# Patient Record
Sex: FEMALE | Race: WHITE | Employment: OTHER | URBAN - NONMETROPOLITAN AREA
[De-identification: names, ages, dates, MRNs, and addresses within clinical notes are randomized per-mention and may not be internally consistent; named-entity substitution may affect disease eponyms.]

---

## 2017-03-07 ENCOUNTER — OFFICE VISIT (OUTPATIENT)
Dept: FAMILY MEDICINE CLINIC | Age: 37
End: 2017-03-07

## 2017-03-07 VITALS
HEART RATE: 78 BPM | SYSTOLIC BLOOD PRESSURE: 110 MMHG | HEIGHT: 64 IN | DIASTOLIC BLOOD PRESSURE: 74 MMHG | BODY MASS INDEX: 35.85 KG/M2 | WEIGHT: 210 LBS | OXYGEN SATURATION: 92 % | TEMPERATURE: 98.3 F

## 2017-03-07 DIAGNOSIS — G81.92: ICD-10-CM

## 2017-03-07 DIAGNOSIS — N92.0 MENORRHAGIA WITH REGULAR CYCLE: Chronic | ICD-10-CM

## 2017-03-07 DIAGNOSIS — R56.9 PARTIAL SEIZURE (HCC): Primary | ICD-10-CM

## 2017-03-07 DIAGNOSIS — R30.0 DYSURIA: ICD-10-CM

## 2017-03-07 LAB
BILIRUBIN, POC: 0
BLOOD URINE, POC: NORMAL
CLARITY, POC: NORMAL
COLOR, POC: YELLOW
GLUCOSE URINE, POC: 0
KETONES, POC: 0
LEUKOCYTE EST, POC: 0
NITRITE, POC: 0
PH, POC: 6
PROTEIN, POC: 0
SPECIFIC GRAVITY, POC: 1.02
UROBILINOGEN, POC: NORMAL

## 2017-03-07 PROCEDURE — 96372 THER/PROPH/DIAG INJ SC/IM: CPT | Performed by: FAMILY MEDICINE

## 2017-03-07 PROCEDURE — 81002 URINALYSIS NONAUTO W/O SCOPE: CPT | Performed by: FAMILY MEDICINE

## 2017-03-07 PROCEDURE — 1036F TOBACCO NON-USER: CPT | Performed by: FAMILY MEDICINE

## 2017-03-07 PROCEDURE — G8484 FLU IMMUNIZE NO ADMIN: HCPCS | Performed by: FAMILY MEDICINE

## 2017-03-07 PROCEDURE — G8417 CALC BMI ABV UP PARAM F/U: HCPCS | Performed by: FAMILY MEDICINE

## 2017-03-07 PROCEDURE — G8428 CUR MEDS NOT DOCUMENT: HCPCS | Performed by: FAMILY MEDICINE

## 2017-03-07 PROCEDURE — 99213 OFFICE O/P EST LOW 20 MIN: CPT | Performed by: FAMILY MEDICINE

## 2017-03-07 RX ORDER — MEDROXYPROGESTERONE ACETATE 150 MG/ML
150 INJECTION, SUSPENSION INTRAMUSCULAR ONCE
Status: COMPLETED | OUTPATIENT
Start: 2017-03-07 | End: 2017-03-07

## 2017-03-07 RX ORDER — CETIRIZINE HYDROCHLORIDE, PSEUDOEPHEDRINE HYDROCHLORIDE 5; 120 MG/1; MG/1
1 TABLET, FILM COATED, EXTENDED RELEASE ORAL 2 TIMES DAILY PRN
Qty: 60 TABLET | Refills: 2 | Status: SHIPPED | OUTPATIENT
Start: 2017-03-07 | End: 2017-11-06

## 2017-03-07 RX ORDER — PHENYTOIN SODIUM 100 MG/1
300 CAPSULE, EXTENDED RELEASE ORAL NIGHTLY
Qty: 90 CAPSULE | Refills: 11 | Status: SHIPPED | OUTPATIENT
Start: 2017-03-07 | End: 2017-08-23 | Stop reason: SDUPTHER

## 2017-03-07 RX ORDER — DEXLANSOPRAZOLE 60 MG/1
60 CAPSULE, DELAYED RELEASE ORAL DAILY
Qty: 30 CAPSULE | Refills: 5 | Status: SHIPPED | OUTPATIENT
Start: 2017-03-07 | End: 2021-10-28 | Stop reason: SDUPTHER

## 2017-03-07 RX ORDER — MEDROXYPROGESTERONE ACETATE 150 MG/ML
150 INJECTION, SUSPENSION INTRAMUSCULAR ONCE
Qty: 1 ML | Refills: 2 | Status: ON HOLD | OUTPATIENT
Start: 2017-03-07 | End: 2017-10-24 | Stop reason: HOSPADM

## 2017-03-07 RX ORDER — POLYETHYLENE GLYCOL 3350 17 G/17G
17 POWDER, FOR SOLUTION ORAL
COMMUNITY
End: 2017-04-12 | Stop reason: SDUPTHER

## 2017-03-07 RX ADMIN — MEDROXYPROGESTERONE ACETATE 150 MG: 150 INJECTION, SUSPENSION INTRAMUSCULAR at 13:41

## 2017-03-07 ASSESSMENT — ENCOUNTER SYMPTOMS
RESPIRATORY NEGATIVE: 1
GASTROINTESTINAL NEGATIVE: 1
SHORTNESS OF BREATH: 0
ABDOMINAL PAIN: 0

## 2017-04-12 RX ORDER — POLYETHYLENE GLYCOL 3350 17 G/17G
POWDER, FOR SOLUTION ORAL
Qty: 527 G | Refills: 0 | Status: SHIPPED | OUTPATIENT
Start: 2017-04-12 | End: 2017-11-06

## 2017-04-13 ENCOUNTER — OFFICE VISIT (OUTPATIENT)
Dept: FAMILY MEDICINE CLINIC | Age: 37
End: 2017-04-13

## 2017-04-13 VITALS
DIASTOLIC BLOOD PRESSURE: 80 MMHG | SYSTOLIC BLOOD PRESSURE: 110 MMHG | TEMPERATURE: 97.8 F | WEIGHT: 209 LBS | HEIGHT: 66 IN | BODY MASS INDEX: 33.59 KG/M2

## 2017-04-13 DIAGNOSIS — J40 BRONCHITIS: Primary | ICD-10-CM

## 2017-04-13 PROCEDURE — G8417 CALC BMI ABV UP PARAM F/U: HCPCS | Performed by: FAMILY MEDICINE

## 2017-04-13 PROCEDURE — 1036F TOBACCO NON-USER: CPT | Performed by: FAMILY MEDICINE

## 2017-04-13 PROCEDURE — G8427 DOCREV CUR MEDS BY ELIG CLIN: HCPCS | Performed by: FAMILY MEDICINE

## 2017-04-13 PROCEDURE — 99213 OFFICE O/P EST LOW 20 MIN: CPT | Performed by: FAMILY MEDICINE

## 2017-04-13 RX ORDER — DOXYCYCLINE HYCLATE 100 MG/1
100 CAPSULE ORAL 2 TIMES DAILY
Qty: 20 CAPSULE | Refills: 0 | Status: SHIPPED | OUTPATIENT
Start: 2017-04-13 | End: 2017-04-23

## 2017-05-08 ENCOUNTER — NURSE ONLY (OUTPATIENT)
Dept: FAMILY MEDICINE CLINIC | Age: 37
End: 2017-05-08

## 2017-05-08 DIAGNOSIS — N92.0 MENORRHAGIA WITH REGULAR CYCLE: Primary | Chronic | ICD-10-CM

## 2017-05-08 PROCEDURE — 96372 THER/PROPH/DIAG INJ SC/IM: CPT | Performed by: FAMILY MEDICINE

## 2017-05-08 RX ORDER — MEDROXYPROGESTERONE ACETATE 150 MG/ML
150 INJECTION, SUSPENSION INTRAMUSCULAR ONCE
Status: COMPLETED | OUTPATIENT
Start: 2017-05-08 | End: 2017-05-08

## 2017-05-08 RX ADMIN — MEDROXYPROGESTERONE ACETATE 150 MG: 150 INJECTION, SUSPENSION INTRAMUSCULAR at 16:45

## 2017-08-07 ENCOUNTER — NURSE ONLY (OUTPATIENT)
Dept: FAMILY MEDICINE CLINIC | Age: 37
End: 2017-08-07
Payer: MEDICARE

## 2017-08-07 DIAGNOSIS — N92.0 MENORRHAGIA WITH REGULAR CYCLE: Primary | Chronic | ICD-10-CM

## 2017-08-07 PROCEDURE — 96372 THER/PROPH/DIAG INJ SC/IM: CPT | Performed by: FAMILY MEDICINE

## 2017-08-07 RX ORDER — MEDROXYPROGESTERONE ACETATE 150 MG/ML
150 INJECTION, SUSPENSION INTRAMUSCULAR ONCE
Status: COMPLETED | OUTPATIENT
Start: 2017-08-07 | End: 2017-08-07

## 2017-08-07 RX ADMIN — MEDROXYPROGESTERONE ACETATE 150 MG: 150 INJECTION, SUSPENSION INTRAMUSCULAR at 16:41

## 2017-08-23 ENCOUNTER — OFFICE VISIT (OUTPATIENT)
Dept: NEUROLOGY | Age: 37
End: 2017-08-23
Payer: MEDICARE

## 2017-08-23 VITALS
HEIGHT: 66 IN | BODY MASS INDEX: 33.11 KG/M2 | WEIGHT: 206 LBS | HEART RATE: 85 BPM | SYSTOLIC BLOOD PRESSURE: 125 MMHG | DIASTOLIC BLOOD PRESSURE: 80 MMHG

## 2017-08-23 DIAGNOSIS — R56.9 PARTIAL SEIZURE (HCC): Primary | ICD-10-CM

## 2017-08-23 PROCEDURE — 99213 OFFICE O/P EST LOW 20 MIN: CPT | Performed by: PSYCHIATRY & NEUROLOGY

## 2017-08-23 PROCEDURE — 1036F TOBACCO NON-USER: CPT | Performed by: PSYCHIATRY & NEUROLOGY

## 2017-08-23 PROCEDURE — G8427 DOCREV CUR MEDS BY ELIG CLIN: HCPCS | Performed by: PSYCHIATRY & NEUROLOGY

## 2017-08-23 PROCEDURE — G8417 CALC BMI ABV UP PARAM F/U: HCPCS | Performed by: PSYCHIATRY & NEUROLOGY

## 2017-08-23 RX ORDER — PHENYTOIN SODIUM 100 MG/1
300 CAPSULE, EXTENDED RELEASE ORAL NIGHTLY
Qty: 90 CAPSULE | Refills: 11 | Status: SHIPPED | OUTPATIENT
Start: 2017-08-23 | End: 2018-04-08 | Stop reason: SDUPTHER

## 2017-09-26 LAB
ABSOLUTE BASO #: 0 K/UL (ref 0–0.1)
ABSOLUTE EOS #: 0.3 K/UL (ref 0.1–0.4)
ABSOLUTE LYMPH #: 1.6 K/UL (ref 0.8–5.2)
ABSOLUTE MONO #: 0.7 K/UL (ref 0.1–0.9)
ABSOLUTE NEUT #: 6.7 K/UL (ref 1.3–9.1)
ALBUMIN SERPL-MCNC: 4.4 G/DL (ref 3.2–5.3)
ALK PHOSPHATASE: 136 IU/L (ref 35–121)
ALT SERPL-CCNC: 15 IU/L (ref 5–59)
AST SERPL-CCNC: 14 IU/L (ref 10–42)
BASOPHILS RELATIVE PERCENT: 0.4 %
BILIRUB SERPL-MCNC: 0.2 MG/DL (ref 0.2–1.3)
BILIRUBIN DIRECT: 0.1 MG/DL (ref 0–0.2)
EOSINOPHILS RELATIVE PERCENT: 3.2 %
HCT VFR BLD CALC: 44.6 % (ref 36–48)
HEMOGLOBIN: 15 G/DL (ref 12–16)
LYMPHOCYTE %: 16.8 %
MCH RBC QN AUTO: 28 PG (ref 27–34)
MCHC RBC AUTO-ENTMCNC: 33.6 G/DL (ref 31–36)
MCV RBC AUTO: 83.4 FL (ref 80–100)
MONOCYTES # BLD: 7.1 %
NEUTROPHILS RELATIVE PERCENT: 72 %
PDW BLD-RTO: 12.6 % (ref 10.8–14.8)
PLATELETS: 381 K/UL (ref 150–450)
RBC: 5.35 M/UL (ref 4–5.5)
TOTAL PROTEIN: 7.1 G/DL (ref 5.8–8)
WBC: 9.3 K/UL (ref 3.7–10.8)

## 2017-09-27 LAB
DILANTIN FREE/TOTAL: 7 UG/ML (ref 10–20)
DOSE TIME: ABNORMAL

## 2017-10-17 ENCOUNTER — TELEPHONE (OUTPATIENT)
Dept: FAMILY MEDICINE CLINIC | Age: 37
End: 2017-10-17

## 2017-10-17 DIAGNOSIS — L98.9 SKIN LESION OF BACK: Primary | ICD-10-CM

## 2017-10-24 ENCOUNTER — HOSPITAL ENCOUNTER (OUTPATIENT)
Age: 37
Setting detail: OUTPATIENT SURGERY
Discharge: HOME | End: 2017-10-24
Attending: INTERNAL MEDICINE | Admitting: INTERNAL MEDICINE
Payer: MEDICARE

## 2017-10-24 ENCOUNTER — ANESTHESIA (OUTPATIENT)
Dept: ENDOSCOPY | Age: 37
End: 2017-10-24
Payer: MEDICARE

## 2017-10-24 ENCOUNTER — ANESTHESIA EVENT (OUTPATIENT)
Dept: ENDOSCOPY | Age: 37
End: 2017-10-24
Payer: MEDICARE

## 2017-10-24 VITALS
SYSTOLIC BLOOD PRESSURE: 129 MMHG | RESPIRATION RATE: 29 BRPM | OXYGEN SATURATION: 98 % | DIASTOLIC BLOOD PRESSURE: 76 MMHG

## 2017-10-24 VITALS
RESPIRATION RATE: 18 BRPM | OXYGEN SATURATION: 99 % | HEART RATE: 94 BPM | HEIGHT: 63 IN | TEMPERATURE: 97.5 F | DIASTOLIC BLOOD PRESSURE: 80 MMHG | WEIGHT: 204 LBS | SYSTOLIC BLOOD PRESSURE: 123 MMHG | BODY MASS INDEX: 36.14 KG/M2

## 2017-10-24 PROCEDURE — 7100000000 HC PACU RECOVERY - FIRST 15 MIN: Performed by: INTERNAL MEDICINE

## 2017-10-24 PROCEDURE — 3609017700 HC EGD DILATION GASTRIC/DUODENAL STRICTURE: Performed by: INTERNAL MEDICINE

## 2017-10-24 PROCEDURE — 3700000000 HC ANESTHESIA ATTENDED CARE: Performed by: INTERNAL MEDICINE

## 2017-10-24 PROCEDURE — 2580000003 HC RX 258: Performed by: INTERNAL MEDICINE

## 2017-10-24 PROCEDURE — 2500000003 HC RX 250 WO HCPCS: Performed by: NURSE ANESTHETIST, CERTIFIED REGISTERED

## 2017-10-24 PROCEDURE — 6360000002 HC RX W HCPCS: Performed by: NURSE ANESTHETIST, CERTIFIED REGISTERED

## 2017-10-24 PROCEDURE — 7100000001 HC PACU RECOVERY - ADDTL 15 MIN: Performed by: INTERNAL MEDICINE

## 2017-10-24 RX ORDER — PROPOFOL 10 MG/ML
INJECTION, EMULSION INTRAVENOUS PRN
Status: DISCONTINUED | OUTPATIENT
Start: 2017-10-24 | End: 2017-10-24 | Stop reason: SDUPTHER

## 2017-10-24 RX ORDER — LIDOCAINE HYDROCHLORIDE 20 MG/ML
INJECTION, SOLUTION INFILTRATION; PERINEURAL PRN
Status: DISCONTINUED | OUTPATIENT
Start: 2017-10-24 | End: 2017-10-24 | Stop reason: SDUPTHER

## 2017-10-24 RX ORDER — SODIUM CHLORIDE 450 MG/100ML
INJECTION, SOLUTION INTRAVENOUS CONTINUOUS
Status: DISCONTINUED | OUTPATIENT
Start: 2017-10-24 | End: 2017-10-24 | Stop reason: HOSPADM

## 2017-10-24 RX ORDER — GLYCOPYRROLATE 0.2 MG/ML
INJECTION INTRAMUSCULAR; INTRAVENOUS PRN
Status: DISCONTINUED | OUTPATIENT
Start: 2017-10-24 | End: 2017-10-24 | Stop reason: SDUPTHER

## 2017-10-24 RX ORDER — FAMOTIDINE 20 MG/1
40 TABLET, FILM COATED ORAL DAILY
COMMUNITY
End: 2022-04-28 | Stop reason: CLARIF

## 2017-10-24 RX ADMIN — SODIUM CHLORIDE: 4.5 INJECTION, SOLUTION INTRAVENOUS at 13:28

## 2017-10-24 RX ADMIN — GLYCOPYRROLATE 0.1 MG: 0.2 INJECTION, SOLUTION INTRAMUSCULAR; INTRAVENOUS at 13:44

## 2017-10-24 RX ADMIN — LIDOCAINE HYDROCHLORIDE 40 MG: 20 INJECTION, SOLUTION INFILTRATION; PERINEURAL at 13:47

## 2017-10-24 RX ADMIN — SODIUM CHLORIDE: 4.5 INJECTION, SOLUTION INTRAVENOUS at 13:44

## 2017-10-24 RX ADMIN — PROPOFOL 100 MG: 10 INJECTION, EMULSION INTRAVENOUS at 13:44

## 2017-10-24 ASSESSMENT — PAIN - FUNCTIONAL ASSESSMENT: PAIN_FUNCTIONAL_ASSESSMENT: 0-10

## 2017-10-24 NOTE — CONSULTS
and discussion of sedation/procedure plan, risks, and expectations with patient and/or responsible adult completed. [x]Patient examined immediately prior to the procedure.  (Refer to nursing sedation/analgesia documentation record)    Cory Garner MD   Electronically signed 10/24/2017 at 1:12 PM

## 2017-10-25 NOTE — PROCEDURES
[address] [place] [address]                                PROCEDURE NOTE    PATIENT NAME: Jordy Be                     :             1980  MED REC NO:   [id number]                            ROOM:  ACCOUNT NO:   [de-identified]                            ADMISSION DATE:  10/24/2017  PROVIDER:     BORIS Leonardo Pilar:  10/24/2017    PROCEDURE:  EGD plus dilation. INDICATION FOR THE PROCEDURE:  The patient with a history of  intermittent dysphagia. See the preop note and the previous office  note for rest of clinicals. ASA CLASSIFICATION:  III. MEDICATION:  Per anesthesia. BIOPSY:  None. PHOTOGRAPHS:  Yes. DESCRIPTION OF THE PROCEDURE:  Informed consent was obtained after  explaining risks and benefits of the procedure and conscious sedation. Possible complications including bleeding, perforation, reaction to  medicine, but not limiting to death were discussed. Afterwards, the  patient was sedated in incremental fashion and the GIF-180 gastroscope  was advanced through oropharynx, esophagus, stomach into the duodenum. Normal-looking duodenal bulb and second portion. Scope was withdrawn. Normal antrum. Retroflex exam showed normal angularis, body of the  stomach, fundus, and cardia. Hiatal hernia was seen, lower esophageal  incompetent, slightly irregular GE junction. Some scarring, but  patient having more scarring close to UES. I felt patient will  benefit from dilation. The scope was advanced back into the antrum. Guidewire was left behind. A 54 dilator was used. The patient having  a component of oropharyngeal dysphagia as well. Tolerated the  procedure well. IMPRESSION:  1. Esophageal scarring, post-dilation. 2.  Longstanding GERD. 3.  Component of oropharyngeal dysphagia. RECOMMENDATION:  Continue Dexilant and we will see the patient in two  months.   If the patient benefit from dilation, will continue with PPI. If there is no improvement, we will consider the modified barium  swallow.         Flower Dickey M.D.    D: 10/24/2017 14:09:11       T: 10/24/2017 14:42:47     [email or web address]  Job#: [id number]     Doc#: [id number]    CC:  _____ 7:44

## 2017-11-06 ENCOUNTER — OFFICE VISIT (OUTPATIENT)
Dept: SURGERY | Age: 37
End: 2017-11-06
Payer: MEDICARE

## 2017-11-06 ENCOUNTER — NURSE ONLY (OUTPATIENT)
Dept: FAMILY MEDICINE CLINIC | Age: 37
End: 2017-11-06
Payer: MEDICARE

## 2017-11-06 VITALS
OXYGEN SATURATION: 98 % | SYSTOLIC BLOOD PRESSURE: 120 MMHG | DIASTOLIC BLOOD PRESSURE: 74 MMHG | WEIGHT: 208.5 LBS | BODY MASS INDEX: 34.74 KG/M2 | RESPIRATION RATE: 16 BRPM | TEMPERATURE: 98 F | HEIGHT: 65 IN | HEART RATE: 82 BPM

## 2017-11-06 DIAGNOSIS — N92.0 MENORRHAGIA WITH REGULAR CYCLE: Primary | Chronic | ICD-10-CM

## 2017-11-06 DIAGNOSIS — L82.1 KERATOSIS, SEBORRHEIC: ICD-10-CM

## 2017-11-06 PROCEDURE — 99201 PR OFFICE OUTPATIENT NEW 10 MINUTES: CPT | Performed by: SURGERY

## 2017-11-06 PROCEDURE — G8484 FLU IMMUNIZE NO ADMIN: HCPCS | Performed by: SURGERY

## 2017-11-06 PROCEDURE — 1036F TOBACCO NON-USER: CPT | Performed by: SURGERY

## 2017-11-06 PROCEDURE — G8417 CALC BMI ABV UP PARAM F/U: HCPCS | Performed by: SURGERY

## 2017-11-06 PROCEDURE — 96372 THER/PROPH/DIAG INJ SC/IM: CPT | Performed by: FAMILY MEDICINE

## 2017-11-06 PROCEDURE — G8427 DOCREV CUR MEDS BY ELIG CLIN: HCPCS | Performed by: SURGERY

## 2017-11-06 RX ORDER — MEDROXYPROGESTERONE ACETATE 150 MG/ML
150 INJECTION, SUSPENSION INTRAMUSCULAR ONCE
Status: COMPLETED | OUTPATIENT
Start: 2017-11-06 | End: 2017-11-06

## 2017-11-06 RX ORDER — CETIRIZINE HYDROCHLORIDE 10 MG/1
10 TABLET ORAL PRN
COMMUNITY
End: 2018-05-07 | Stop reason: SDUPTHER

## 2017-11-06 RX ADMIN — MEDROXYPROGESTERONE ACETATE 150 MG: 150 INJECTION, SUSPENSION INTRAMUSCULAR at 11:59

## 2017-11-06 ASSESSMENT — ENCOUNTER SYMPTOMS
ANAL BLEEDING: 0
SORE THROAT: 0
BACK PAIN: 0
STRIDOR: 0
PHOTOPHOBIA: 0
APNEA: 0
EYE REDNESS: 0
CHOKING: 0
CHEST TIGHTNESS: 0
COLOR CHANGE: 0
SINUS PRESSURE: 0
WHEEZING: 0
BLOOD IN STOOL: 0
FACIAL SWELLING: 0
COUGH: 0
TROUBLE SWALLOWING: 0
EYE DISCHARGE: 0
VOICE CHANGE: 0
ROS SKIN COMMENTS: MASS ON BACK
SHORTNESS OF BREATH: 0
CONSTIPATION: 0
ABDOMINAL PAIN: 0
VOMITING: 0
RHINORRHEA: 0
ABDOMINAL DISTENTION: 0
NAUSEA: 0
DIARRHEA: 0
EYE ITCHING: 0
RECTAL PAIN: 0
EYE PAIN: 0

## 2017-11-06 NOTE — PROGRESS NOTES
Doris Cota MD [facility]  General Surgery  New Patient Evaluation in Office  Pt Name: Rehan Yen  Date of Birth 1980   Today's Date: 11/6/2017  Medical Record Number: [id number]  Referring Provider: Brennon Chow MD  Primary Care Provider: Jermaine Jean-Baptiste MD  Chief Complaint   Patient presents with   [employer] [facility]     new pt-ref. Dr. Lauri Salinas for skin lesion on back-     ASSESSMENT      1. Keratosis, seborrheic       Past Medical History:   Diagnosis Date    Autism     Moderately mentally retarded     Nausea & vomiting 2013    Seizures ([address]           PLANS      1. Schedule Trish for nothing at this time  2. This is a benign lesion with no risk  3. It could be treated easily in a dermatologist office with no surgery if they desired      Cliff Douglas is a 44 y.o. female seen in the consultation for evaluation of a skin lesion on her back. We have seen her in the remote past for a lap erin. Per the Mom she has had the rough patch slowly enlarging on her back, and has several other smaller ones. Pt does not c/o and it does not seem to bother her.  No ulceration, or bleeding  Past Medical History  Past Medical History:   Diagnosis Date    Autism     Moderately mentally retarded     Nausea & vomiting 2013    Seizures [facility])      Past Surgical History  Past Surgical History:   Procedure Laterality Date    CHOLECYSTECTOMY, LAPAROSCOPIC  04/05/2013    robotic assist multiport    NASAL POLYP SURGERY  2013    OPAL    DC EGD BALLOON DILATION ESOPHAGUS <30 MM DIAM N/A 10/24/2017    EGD ESOPHAGOGASTRODUODENOSCOPY DILAT[facility]    UPPER GASTROINTESTINAL ENDOSCOPY       Medications  Current Outpatient Prescriptions   Medication Sig Dispense Refill    cetirizine (ZYRTEC) 10 MG tablet Take 10 mg by mouth as needed for Allergies      famotidine (PEPCID) 20 MG tablet Take 20 mg by mouth daily      phenytoin (DILANTIN) 100 MG ER capsule Take 3 capsules by mouth nightly 90 capsule 11    dexlansoprazole (DEXILANT) 60 MG CPDR delayed release capsule Take 1 capsule by mouth daily 30 capsule 5    Calcium Carbonate-Vitamin D (CALCIUM 500 + D) 500-125 MG-UNIT TABS Take 1 tablet by mouth 2 times daily 60 tablet 5    linaclotide (LINZESS) 290 MCG CAPS capsule Take 290 mcg by mouth every morning (before breakfast)      Diclofenac Potassium 25 MG CAPS Take 1 tablet by mouth 2 times daily       No current facility-administered medications for this visit. Allergies  is allergic to seasonal.  Family History  family history includes Arthritis in her father; Diabetes in her father and paternal grandfather; Heart Disease in her maternal grandfather; High Blood Pressure in her father and mother; High Cholesterol in her father and mother. Social History   reports that she has never smoked. She has never used smokeless tobacco. She reports that she does not drink alcohol or use drugs. Health Screening Exams  Health Maintenance   Topic Date Due    HIV screen  09/07/1994    DTaP/Tdap/Td vaccine (1 - Tdap) 09/07/1998    Flu vaccine (1) 09/01/2017    Cervical cancer screen  09/10/2018     Review of Systems  Constitutional: Negative for activity change, appetite change, chills, diaphoresis, fatigue, fever and unexpected weight change. HENT: Negative for congestion, dental problem, drooling, ear discharge, ear pain, facial swelling, hearing loss, mouth sores, nosebleeds, postnasal drip, rhinorrhea, sinus pressure, sneezing, sore throat, tinnitus, trouble swallowing and voice change. Eyes: Negative for photophobia, pain, discharge, redness, itching and visual disturbance. Respiratory: Negative for apnea, cough, choking, chest tightness, shortness of breath, wheezing and stridor. Cardiovascular: Negative for chest pain, palpitations and leg swelling.    Gastrointestinal: Negative for abdominal distention, abdominal pain, anal bleeding, blood in stool, constipation, diarrhea, nausea, rectal pain and vomiting. Genitourinary: Negative for decreased urine volume, difficulty urinating, dyspareunia, dysuria, enuresis, flank pain, frequency, genital sores, hematuria, menstrual problem, pelvic pain, urgency, vaginal bleeding, vaginal discharge and vaginal pain. Musculoskeletal: Negative for arthralgias, back pain, gait problem, joint swelling, myalgias, neck pain and neck stiffness. Skin: Positive for wound. Negative for color change, pallor and rash. Mass on back    Neurological: Negative for dizziness, tremors, seizures, syncope, facial asymmetry, speech difficulty, weakness, light-headedness, numbness and headaches. Hematological: Negative for adenopathy. Does not bruise/bleed easily. Psychiatric/Behavioral: Negative for agitation, behavioral problems, confusion, decreased concentration, dysphoric mood, hallucinations, self-injury, sleep disturbance and suicidal ideas. The patient is not nervous/anxious and is not hyperactive    OBJECTIVE    VITALS:  height is 5' 5\" (1.651 m) and weight is 208 lb 8 oz (94.6 kg). Her tympanic temperature is 98 °F (36.7 °C). Her blood pressure is 120/74 and her pulse is 82. Her respiration is 16 and oxygen saturation is 98%. CONSTITUTIONAL: Alert and oriented times 3, no acute distress and cooperative to examination with proper mood and affect. SKIN: Skin color, texture, turgor normal. Raised dry typical seborrheic keratoses on her upper mid back, well defined verrucous like lesion. No worrisome characteristics. Thank you for the interesting evaluation. Further recommendations as listed above.        Electronically signed by Stormy Hodges MD on 11/7/2017 at 12:59 PM

## 2017-11-06 NOTE — LETTER
[facility]  Oralia Gentile MD FACS  Phone- [phone]  Fax [phone]    Pt Name: Nelli Reich  Medical Record Number: [id number]  Date of Birth 1980   Today's Date: 11/7/2017    Jacque Arias was evaluated in the office today. My assessment and plans are listed below. Assessment:     Shruthi Silveira was seen today for surgical consult. Diagnoses and all orders for this visit:    Keratosis, seborrheic         Plan:     1. Schedule Trish for nothing at this time  2. This is a benign lesion with no risk  3. It could be treated easily in a dermatologist office with no surgery if they desired              If I can provide any additional assistance or you have any concerns, please feel free to contact me. Thank you for allowing to participate in the care of your patients. Sincerely,      Oralia Gentile MD FACS  [address]  [place], [address]  Office: [phone]  Fax: [phone]

## 2017-11-07 PROBLEM — L82.1 KERATOSIS, SEBORRHEIC: Status: ACTIVE | Noted: 2017-11-07

## 2018-01-17 ENCOUNTER — HOSPITAL ENCOUNTER (EMERGENCY)
Age: 38
Discharge: HOME | End: 2018-01-17
Attending: FAMILY MEDICINE
Payer: MEDICARE

## 2018-01-17 ENCOUNTER — APPOINTMENT (OUTPATIENT)
Dept: GENERAL RADIOLOGY | Age: 38
End: 2018-01-17
Payer: MEDICARE

## 2018-01-17 VITALS
WEIGHT: 200 LBS | TEMPERATURE: 98.9 F | HEART RATE: 106 BPM | DIASTOLIC BLOOD PRESSURE: 88 MMHG | RESPIRATION RATE: 18 BRPM | SYSTOLIC BLOOD PRESSURE: 144 MMHG | OXYGEN SATURATION: 97 % | BODY MASS INDEX: 30.31 KG/M2 | HEIGHT: 68 IN

## 2018-01-17 DIAGNOSIS — J10.1 INFLUENZA A: Primary | ICD-10-CM

## 2018-01-17 LAB
FLU A ANTIGEN: POSITIVE
FLU B ANTIGEN: NEGATIVE

## 2018-01-17 PROCEDURE — 99283 EMERGENCY DEPT VISIT LOW MDM: CPT

## 2018-01-17 PROCEDURE — 6370000000 HC RX 637 (ALT 250 FOR IP): Performed by: FAMILY MEDICINE

## 2018-01-17 PROCEDURE — 87804 INFLUENZA ASSAY W/OPTIC: CPT

## 2018-01-17 PROCEDURE — 71046 X-RAY EXAM CHEST 2 VIEWS: CPT

## 2018-01-17 RX ORDER — OSELTAMIVIR PHOSPHATE 75 MG/1
75 CAPSULE ORAL 2 TIMES DAILY
Qty: 10 CAPSULE | Refills: 0 | Status: SHIPPED | OUTPATIENT
Start: 2018-01-17 | End: 2018-01-22

## 2018-01-17 RX ORDER — OSELTAMIVIR PHOSPHATE 75 MG/1
75 CAPSULE ORAL ONCE
Status: COMPLETED | OUTPATIENT
Start: 2018-01-17 | End: 2018-01-17

## 2018-01-17 RX ADMIN — OSELTAMIVIR PHOSPHATE 75 MG: 75 CAPSULE ORAL at 13:24

## 2018-01-17 ASSESSMENT — ENCOUNTER SYMPTOMS
SHORTNESS OF BREATH: 0
NAUSEA: 0
ABDOMINAL DISTENTION: 0
VOICE CHANGE: 0
STRIDOR: 0
CHEST TIGHTNESS: 0
FACIAL SWELLING: 0
EYE REDNESS: 0
CONSTIPATION: 0
EYE PAIN: 0
COUGH: 1
WHEEZING: 0
RHINORRHEA: 1
BACK PAIN: 0
PHOTOPHOBIA: 0
SORE THROAT: 0
COLOR CHANGE: 0
VOMITING: 0
DIARRHEA: 0
ABDOMINAL PAIN: 0
EYE DISCHARGE: 0

## 2018-01-17 NOTE — ED NOTES
Pt resting, resp even and unlabored, skin pink, warm and dry. Worker and pt given discharge instructions and verbalizes understanding, pt released.       Beth Huerta RN  01/17/18 [id number]

## 2018-01-17 NOTE — ED PROVIDER NOTES
[address]       Chief Complaint   Patient presents with    Cough       Nurses Notes reviewed and I agree except as noted in the HPI. HISTORY OF PRESENT ILLNESS    Vamshi Pina is a 44 y.o. female who presents With cough, nasal congestion. Symptoms started 2 days ago. The patient is mentally challenged. She is accompanied by a caretaker who is providing history. Patient does not communicate with the provider. The caretaker states that she's been giving her some cough and cold medicine however the symptoms are persisting. REVIEW OF SYSTEMS     Review of Systems   Constitutional: Negative for appetite change, chills, diaphoresis and fever (Non toxic appearence). HENT: Positive for rhinorrhea. Negative for congestion, dental problem, ear pain, facial swelling, sore throat, tinnitus and voice change. Eyes: Negative for photophobia, pain, discharge and redness. Respiratory: Positive for cough. Negative for chest tightness, shortness of breath, wheezing and stridor. Cardiovascular: Negative for chest pain, palpitations and leg swelling. Gastrointestinal: Negative for abdominal distention, abdominal pain, constipation, diarrhea, nausea and vomiting. Genitourinary: Negative for difficulty urinating, dysuria, flank pain, genital sores, pelvic pain, urgency, vaginal bleeding and vaginal discharge. Musculoskeletal: Negative for arthralgias, back pain, joint swelling, myalgias and neck stiffness. Skin: Negative for color change and rash. Neurological: Negative for dizziness, tremors, seizures, syncope, weakness, numbness and headaches. Psychiatric/Behavioral: Negative for agitation, hallucinations, sleep disturbance and suicidal ideas. The patient is not nervous/anxious. All other systems reviewed and are negative. PAST MEDICAL HISTORY    has a past medical history of Autism;  Moderately mentally retarded; Nausea & vomiting; and Seizures ([facility] SURGICAL HISTORY      has a past surgical history that includes Nasal polyp surgery (2013); Cholecystectomy, laparoscopic (04/05/2013); Upper gastrointestinal endoscopy; and egd balloon dilation esophagus <30 mm diam (N/A, 10/24/2017). CURRENT MEDICATIONS       Discharge Medication List as of 1/17/2018  1:23 PM      CONTINUE these medications which have NOT CHANGED    Details   cetirizine (ZYRTEC) 10 MG tablet Take 10 mg by mouth as needed for AllergiesHistorical Med      famotidine (PEPCID) 20 MG tablet Take 20 mg by mouth dailyHistorical Med      phenytoin (DILANTIN) 100 MG ER capsule Take 3 capsules by mouth nightly, Disp-90 capsule, R-11Normal      dexlansoprazole (DEXILANT) 60 MG CPDR delayed release capsule Take 1 capsule by mouth daily, Disp-30 capsule, R-5Normal      Calcium Carbonate-Vitamin D (CALCIUM 500 + D) 500-125 MG-UNIT TABS Take 1 tablet by mouth 2 times daily, Disp-60 tablet, R-5Normal      linaclotide (LINZESS) 290 MCG CAPS capsule Take 290 mcg by mouth every morning (before breakfast)      Diclofenac Potassium 25 MG CAPS Take 1 tablet by mouth 2 times daily             ALLERGIES     is allergic to seasonal.    FAMILY HISTORY     indicated that her mother is alive. She indicated that her father is alive. She indicated that the status of her maternal grandfather is unknown. She indicated that the status of her paternal grandfather is unknown.    family history includes Arthritis in her father; Diabetes in her father and paternal grandfather; Heart Disease in her maternal grandfather; High Blood Pressure in her father and mother; High Cholesterol in her father and mother. SOCIAL HISTORY      reports that she has never smoked. She has never used smokeless tobacco. She reports that she does not drink alcohol or use drugs. PHYSICAL EXAM     INITIAL VITALS:  height is 5' 8\" (1.727 m) and weight is 200 lb (90.7 kg). Her axillary temperature is 98.9 °F (37.2 °C).  Her blood pressure is should worsen.      PATIENT REFERRED TO:  Gi Herrera MD  [address] [facility],[address]  [address] [address],[address]  [facility], [place], operated by [facility]  [phone]    In 3 days  If symptoms worsen      DISCHARGE MEDICATIONS:  Discharge Medication List as of 1/17/2018  1:23 PM      START taking these medications    Details   oseltamivir (TAMIFLU) 75 MG capsule Take 1 capsule by mouth 2 times daily for 5 days, Disp-10 capsule, R-0Print             (Please note that portions of this note were completed with a voice recognition program.  Efforts were made to edit the dictations but occasionally words are mis-transcribed.)    MD Tommy Fang MD  01/17/18 [address] [address], MD  01/22/18 2422

## 2018-02-06 RX ORDER — MEDROXYPROGESTERONE ACETATE 150 MG/ML
INJECTION, SUSPENSION INTRAMUSCULAR
COMMUNITY
Start: 2017-11-01 | End: 2018-08-10 | Stop reason: SDUPTHER

## 2018-02-06 RX ORDER — MEDROXYPROGESTERONE ACETATE 150 MG/ML
150 INJECTION, SUSPENSION INTRAMUSCULAR ONCE
Qty: 1 ML | Refills: 2 | Status: SHIPPED | OUTPATIENT
Start: 2018-02-06 | End: 2018-10-08 | Stop reason: SDUPTHER

## 2018-02-07 ENCOUNTER — NURSE ONLY (OUTPATIENT)
Dept: FAMILY MEDICINE CLINIC | Age: 38
End: 2018-02-07
Payer: MEDICARE

## 2018-02-07 DIAGNOSIS — N92.0 MENORRHAGIA WITH REGULAR CYCLE: Primary | Chronic | ICD-10-CM

## 2018-02-07 PROCEDURE — 96372 THER/PROPH/DIAG INJ SC/IM: CPT | Performed by: FAMILY MEDICINE

## 2018-02-07 RX ORDER — MEDROXYPROGESTERONE ACETATE 150 MG/ML
150 INJECTION, SUSPENSION INTRAMUSCULAR ONCE
Status: COMPLETED | OUTPATIENT
Start: 2018-02-07 | End: 2018-02-07

## 2018-02-07 RX ADMIN — MEDROXYPROGESTERONE ACETATE 150 MG: 150 INJECTION, SUSPENSION INTRAMUSCULAR at 11:26

## 2018-02-13 ENCOUNTER — OFFICE VISIT (OUTPATIENT)
Dept: FAMILY MEDICINE CLINIC | Age: 38
End: 2018-02-13
Payer: MEDICARE

## 2018-02-13 VITALS
DIASTOLIC BLOOD PRESSURE: 78 MMHG | BODY MASS INDEX: 32.58 KG/M2 | HEIGHT: 67 IN | WEIGHT: 207.6 LBS | SYSTOLIC BLOOD PRESSURE: 112 MMHG | HEART RATE: 88 BPM

## 2018-02-13 DIAGNOSIS — G81.11 RIGHT SPASTIC HEMIPARESIS (HCC): ICD-10-CM

## 2018-02-13 DIAGNOSIS — R56.9 SEIZURES (HCC): ICD-10-CM

## 2018-02-13 DIAGNOSIS — N30.90 CYSTITIS: ICD-10-CM

## 2018-02-13 DIAGNOSIS — L30.9 DERMATITIS: Primary | ICD-10-CM

## 2018-02-13 PROCEDURE — G8417 CALC BMI ABV UP PARAM F/U: HCPCS | Performed by: FAMILY MEDICINE

## 2018-02-13 PROCEDURE — G8484 FLU IMMUNIZE NO ADMIN: HCPCS | Performed by: FAMILY MEDICINE

## 2018-02-13 PROCEDURE — G8427 DOCREV CUR MEDS BY ELIG CLIN: HCPCS | Performed by: FAMILY MEDICINE

## 2018-02-13 PROCEDURE — 1036F TOBACCO NON-USER: CPT | Performed by: FAMILY MEDICINE

## 2018-02-13 PROCEDURE — 99213 OFFICE O/P EST LOW 20 MIN: CPT | Performed by: FAMILY MEDICINE

## 2018-02-13 RX ORDER — NYSTATIN 100000 [USP'U]/G
POWDER TOPICAL
Qty: 1 BOTTLE | Refills: 1 | Status: SHIPPED | OUTPATIENT
Start: 2018-02-13 | End: 2019-09-19

## 2018-02-13 RX ORDER — CLOTRIMAZOLE AND BETAMETHASONE DIPROPIONATE 10; .64 MG/G; MG/G
CREAM TOPICAL
Qty: 60 G | Refills: 1 | Status: SHIPPED | OUTPATIENT
Start: 2018-02-13 | End: 2018-08-06 | Stop reason: SDUPTHER

## 2018-02-13 RX ORDER — CIPROFLOXACIN 250 MG/1
250 TABLET, FILM COATED ORAL 2 TIMES DAILY
Qty: 10 TABLET | Refills: 0 | Status: SHIPPED | OUTPATIENT
Start: 2018-02-13 | End: 2018-02-18

## 2018-02-13 NOTE — PROGRESS NOTES
Name:  Katherine Santiago  :    1980    Chief Complaint   Patient presents with    Other     bright red(rash) in the groin areas       HPI:  Here with care taker at group home. Having recurrent intertrigo and seems symptomatic. Also they have noticed UTI symptoms, but unable to get sample. No fever and not sick in general.   Parents in [place]. Physical Exam:      /78 (Site: Left Arm, Position: Sitting, Cuff Size: Large Adult)   Pulse 88   Ht 5' 7\" (1.702 m)   Wt 207 lb 9.6 oz (94.2 kg)   BMI 32.51 kg/m²     Not ill. Normal interaction for her. Lungs are clear. Heart in RRR with no murmur. Groin red rash that is dry and no odor. Assessment/Plan:      Treat as cystitis. 5 days of Cipro. Dermatitis---Refill Lotrisone and powder. Yosi Christianson was seen today for other. Diagnoses and all orders for this visit:    Dermatitis  -     clotrimazole-betamethasone (LOTRISONE) 1-0.05 % cream; Apply topically 2 times daily. Till clear  -     nystatin (MYCOSTATIN) 732501 UNIT/GM powder; Apply topically 2 times daily. Cystitis  -     ciprofloxacin (CIPRO) 250 MG tablet;  Take 1 tablet by mouth 2 times daily for 5 days

## 2018-04-08 DIAGNOSIS — R56.9 PARTIAL SEIZURE (HCC): ICD-10-CM

## 2018-04-09 RX ORDER — PHENYTOIN SODIUM 100 MG/1
CAPSULE, EXTENDED RELEASE ORAL
Qty: 93 CAPSULE | Refills: 0 | Status: SHIPPED | OUTPATIENT
Start: 2018-04-09 | End: 2018-07-14 | Stop reason: SDUPTHER

## 2018-05-07 ENCOUNTER — NURSE ONLY (OUTPATIENT)
Dept: FAMILY MEDICINE CLINIC | Age: 38
End: 2018-05-07
Payer: MEDICARE

## 2018-05-07 DIAGNOSIS — N92.0 MENORRHAGIA WITH REGULAR CYCLE: Primary | Chronic | ICD-10-CM

## 2018-05-07 PROCEDURE — 96372 THER/PROPH/DIAG INJ SC/IM: CPT | Performed by: FAMILY MEDICINE

## 2018-05-07 RX ORDER — CETIRIZINE HYDROCHLORIDE 10 MG/1
10 TABLET ORAL PRN
Qty: 90 TABLET | Refills: 3 | Status: SHIPPED | OUTPATIENT
Start: 2018-05-07 | End: 2019-09-09 | Stop reason: SDUPTHER

## 2018-05-07 RX ORDER — MEDROXYPROGESTERONE ACETATE 150 MG/ML
150 INJECTION, SUSPENSION INTRAMUSCULAR ONCE
Status: COMPLETED | OUTPATIENT
Start: 2018-05-07 | End: 2018-05-07

## 2018-05-07 RX ADMIN — MEDROXYPROGESTERONE ACETATE 150 MG: 150 INJECTION, SUSPENSION INTRAMUSCULAR at 16:13

## 2018-05-21 ENCOUNTER — OFFICE VISIT (OUTPATIENT)
Dept: FAMILY MEDICINE CLINIC | Age: 38
End: 2018-05-21
Payer: MEDICARE

## 2018-05-21 VITALS
BODY MASS INDEX: 32.18 KG/M2 | HEIGHT: 67 IN | DIASTOLIC BLOOD PRESSURE: 86 MMHG | SYSTOLIC BLOOD PRESSURE: 132 MMHG | WEIGHT: 205 LBS | HEART RATE: 88 BPM

## 2018-05-21 DIAGNOSIS — L30.8 OTHER ECZEMA: Primary | ICD-10-CM

## 2018-05-21 PROCEDURE — 1036F TOBACCO NON-USER: CPT | Performed by: FAMILY MEDICINE

## 2018-05-21 PROCEDURE — G8417 CALC BMI ABV UP PARAM F/U: HCPCS | Performed by: FAMILY MEDICINE

## 2018-05-21 PROCEDURE — G8427 DOCREV CUR MEDS BY ELIG CLIN: HCPCS | Performed by: FAMILY MEDICINE

## 2018-05-21 PROCEDURE — 99213 OFFICE O/P EST LOW 20 MIN: CPT | Performed by: FAMILY MEDICINE

## 2018-05-21 RX ORDER — TRIAMCINOLONE ACETONIDE 1 MG/G
CREAM TOPICAL
Qty: 30 G | Refills: 0 | Status: SHIPPED | OUTPATIENT
Start: 2018-05-21 | End: 2018-08-06 | Stop reason: SDUPTHER

## 2018-07-30 ENCOUNTER — HOSPITAL ENCOUNTER (EMERGENCY)
Age: 38
Discharge: HOME | End: 2018-07-30
Attending: EMERGENCY MEDICINE
Payer: MEDICARE

## 2018-07-30 ENCOUNTER — APPOINTMENT (OUTPATIENT)
Dept: GENERAL RADIOLOGY | Age: 38
End: 2018-07-30
Payer: MEDICARE

## 2018-07-30 VITALS
OXYGEN SATURATION: 95 % | HEART RATE: 80 BPM | DIASTOLIC BLOOD PRESSURE: 119 MMHG | TEMPERATURE: 97.8 F | WEIGHT: 210 LBS | HEIGHT: 62 IN | SYSTOLIC BLOOD PRESSURE: 143 MMHG | RESPIRATION RATE: 18 BRPM | BODY MASS INDEX: 38.64 KG/M2

## 2018-07-30 DIAGNOSIS — S83.005A PATELLAR DISLOCATION, LEFT, INITIAL ENCOUNTER: Primary | ICD-10-CM

## 2018-07-30 PROCEDURE — 6370000000 HC RX 637 (ALT 250 FOR IP): Performed by: EMERGENCY MEDICINE

## 2018-07-30 PROCEDURE — 73564 X-RAY EXAM KNEE 4 OR MORE: CPT

## 2018-07-30 PROCEDURE — 99283 EMERGENCY DEPT VISIT LOW MDM: CPT

## 2018-07-30 PROCEDURE — L1830 KO IMMOB CANVAS LONG PRE OTS: HCPCS

## 2018-07-30 RX ORDER — IBUPROFEN 200 MG
600 TABLET ORAL ONCE
Status: COMPLETED | OUTPATIENT
Start: 2018-07-30 | End: 2018-07-30

## 2018-07-30 RX ADMIN — IBUPROFEN 600 MG: 200 TABLET, FILM COATED ORAL at 19:05

## 2018-07-30 ASSESSMENT — PAIN SCALES - WONG BAKER: WONGBAKER_NUMERICALRESPONSE: 4

## 2018-07-30 ASSESSMENT — PAIN DESCRIPTION - PAIN TYPE: TYPE: ACUTE PAIN

## 2018-07-30 ASSESSMENT — PAIN SCALES - GENERAL: PAINLEVEL_OUTOF10: 5
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
## 2018-07-31 ENCOUNTER — OFFICE VISIT (OUTPATIENT)
Dept: FAMILY MEDICINE CLINIC | Age: 38
End: 2018-07-31
Payer: MEDICARE

## 2018-07-31 VITALS — SYSTOLIC BLOOD PRESSURE: 118 MMHG | HEIGHT: 67 IN | DIASTOLIC BLOOD PRESSURE: 76 MMHG | BODY MASS INDEX: 32.89 KG/M2

## 2018-07-31 DIAGNOSIS — F84.0 AUTISM: Chronic | ICD-10-CM

## 2018-07-31 DIAGNOSIS — S83.005A DISLOCATION OF LEFT PATELLA, INITIAL ENCOUNTER: Primary | ICD-10-CM

## 2018-07-31 PROCEDURE — G8427 DOCREV CUR MEDS BY ELIG CLIN: HCPCS | Performed by: FAMILY MEDICINE

## 2018-07-31 PROCEDURE — 1036F TOBACCO NON-USER: CPT | Performed by: FAMILY MEDICINE

## 2018-07-31 PROCEDURE — G8417 CALC BMI ABV UP PARAM F/U: HCPCS | Performed by: FAMILY MEDICINE

## 2018-07-31 PROCEDURE — 99213 OFFICE O/P EST LOW 20 MIN: CPT | Performed by: FAMILY MEDICINE

## 2018-07-31 RX ORDER — LORAZEPAM 0.5 MG/1
0.5 TABLET ORAL
Qty: 2 TABLET | Refills: 0 | Status: SHIPPED | OUTPATIENT
Start: 2018-07-31 | End: 2018-08-01

## 2018-08-01 ENCOUNTER — TELEPHONE (OUTPATIENT)
Dept: FAMILY MEDICINE CLINIC | Age: 38
End: 2018-08-01

## 2018-08-06 DIAGNOSIS — L30.9 DERMATITIS: ICD-10-CM

## 2018-08-06 RX ORDER — CLOTRIMAZOLE AND BETAMETHASONE DIPROPIONATE 10; .64 MG/G; MG/G
CREAM TOPICAL
Qty: 60 G | Refills: 1 | Status: SHIPPED | OUTPATIENT
Start: 2018-08-06 | End: 2018-10-01 | Stop reason: HOSPADM

## 2018-08-06 RX ORDER — TRIAMCINOLONE ACETONIDE 1 MG/G
CREAM TOPICAL
Qty: 30 G | Refills: 0 | Status: SHIPPED | OUTPATIENT
Start: 2018-08-06 | End: 2018-10-01 | Stop reason: SDUPTHER

## 2018-08-06 NOTE — TELEPHONE ENCOUNTER
Enamorado Brilliant called requesting a refill on the following medications:  Requested Prescriptions     Pending Prescriptions Disp Refills    clotrimazole-betamethasone (LOTRISONE) 1-0.05 % cream 60 g 1     Sig: Apply topically 2 times daily. Till clear    triamcinolone (KENALOG) 0.1 % cream 30 g 0     Sig: Apply topically 2 times daily.        Date of last visit: 7/31/2018  Date of next visit (if applicable):Visit date not found  Date of last refill:   Pharmacy Name: [address] [place] [address] Doni Garcia,  James Briones, [address] [place] [address]

## 2018-08-10 ENCOUNTER — OFFICE VISIT (OUTPATIENT)
Dept: FAMILY MEDICINE CLINIC | Age: 38
End: 2018-08-10
Payer: MEDICARE

## 2018-08-10 VITALS
SYSTOLIC BLOOD PRESSURE: 130 MMHG | HEART RATE: 76 BPM | WEIGHT: 211.2 LBS | DIASTOLIC BLOOD PRESSURE: 80 MMHG | HEIGHT: 67 IN | BODY MASS INDEX: 33.15 KG/M2

## 2018-08-10 DIAGNOSIS — S83.005A PATELLAR DISLOCATION, LEFT, INITIAL ENCOUNTER: Primary | ICD-10-CM

## 2018-08-10 DIAGNOSIS — N92.0 MENORRHAGIA WITH REGULAR CYCLE: Chronic | ICD-10-CM

## 2018-08-10 PROCEDURE — G8417 CALC BMI ABV UP PARAM F/U: HCPCS | Performed by: FAMILY MEDICINE

## 2018-08-10 PROCEDURE — 99212 OFFICE O/P EST SF 10 MIN: CPT | Performed by: FAMILY MEDICINE

## 2018-08-10 PROCEDURE — G8427 DOCREV CUR MEDS BY ELIG CLIN: HCPCS | Performed by: FAMILY MEDICINE

## 2018-08-10 PROCEDURE — 96372 THER/PROPH/DIAG INJ SC/IM: CPT | Performed by: FAMILY MEDICINE

## 2018-08-10 PROCEDURE — 1036F TOBACCO NON-USER: CPT | Performed by: FAMILY MEDICINE

## 2018-08-10 RX ORDER — MEDROXYPROGESTERONE ACETATE 150 MG/ML
150 INJECTION, SUSPENSION INTRAMUSCULAR ONCE
Qty: 1 ML | Refills: 2 | Status: CANCELLED | OUTPATIENT
Start: 2018-08-10 | End: 2018-08-10

## 2018-08-10 RX ORDER — MEDROXYPROGESTERONE ACETATE 150 MG/ML
150 INJECTION, SUSPENSION INTRAMUSCULAR
Qty: 1 ML | Refills: 4 | Status: SHIPPED | OUTPATIENT
Start: 2018-08-10 | End: 2019-09-19 | Stop reason: SDUPTHER

## 2018-08-10 RX ORDER — MEDROXYPROGESTERONE ACETATE 150 MG/ML
150 INJECTION, SUSPENSION INTRAMUSCULAR ONCE
Status: COMPLETED | OUTPATIENT
Start: 2018-08-10 | End: 2018-08-10

## 2018-08-10 RX ADMIN — MEDROXYPROGESTERONE ACETATE 150 MG: 150 INJECTION, SUSPENSION INTRAMUSCULAR at 11:22

## 2018-08-10 NOTE — PROGRESS NOTES
Administrations This Visit     medroxyPROGESTERone (DEPO-PROVERA) injection 150 mg     Admin Date  08/10/2018  11:22 Action  Given Dose  150 mg Route  Intramuscular Site  Dorsogluteal Left Administered By  Shellie Davidson CMA (Joleen Ayala)    Ordering Provider:  Ovidio Blackmon MD    Patient Supplied?:  Yes                Patient instructed to remain in clinic for 20 minutes after injection and was advised to report any adverse reaction to me immediately.
breakfast)      Diclofenac Potassium 25 MG CAPS Take 1 tablet by mouth 2 times daily      medroxyPROGESTERone (DEPO-PROVERA) 150 MG/ML injection Inject 1 mL into the muscle once for 1 dose EVERY 3 MONTHS 1 mL 2     No current facility-administered medications for this visit. Allergies   Allergen Reactions    Seasonal        Objective:     /80 (Site: Left Arm, Position: Sitting, Cuff Size: Large Adult)   Pulse 76   Ht 5' 7\" (1.702 m)   Wt 211 lb 3.2 oz (95.8 kg)   BMI 33.08 kg/m²   Physical Exam   Constitutional: She appears well-developed and well-nourished. No distress. Neurological: She is alert. Vitals reviewed. Left Knee  Alignment:  neutral   Inspection:  no ecchymosis or erythema. ROM:  0 degrees extension to 120 degrees flexion   Crepitus:  no   Joint Tenderness:  medial border of patella   Effusion:   moderate   Strength:  deferred   Patellar apprehension test:  deferred   Varus laxity at 0 degrees:  negative      Varus laxity at 30 degrees:  negative   Valgus laxity at 0 degrees:  negative      Valgus laxity at 30 degrees:   negative   Blanche's test:  not tested      Calf is soft and nontender. No edema distally. sensation intact to light touch.     She is unable to follow commands for strength testing and to perform part of the knee exam today    MRI left knee:  Findings compatible with chronic patellar dislocation/relocation with bone     contusions of the lateral femoral condyle, the medial patellar facet,     partial tear of the medial patellofemoral ligament/medial retinaculum,     thickening of the lateral retinaculum with a lateral retinacular sprain.          Marked thinning of the articular cartilage of the patellofemoral     compartment.          Joint effusion with the popliteal cyst which has ruptured distally. Impression/Plan:  1. Patellar dislocation, left, initial encounter  Left patellar dislocation s/p 2 weeks since injury. MRI report reviewed.   No

## 2018-08-27 ENCOUNTER — OFFICE VISIT (OUTPATIENT)
Dept: NEUROLOGY | Age: 38
End: 2018-08-27
Payer: MEDICARE

## 2018-08-27 VITALS
HEIGHT: 64 IN | SYSTOLIC BLOOD PRESSURE: 122 MMHG | HEART RATE: 88 BPM | DIASTOLIC BLOOD PRESSURE: 74 MMHG | WEIGHT: 211 LBS | BODY MASS INDEX: 36.02 KG/M2

## 2018-08-27 DIAGNOSIS — G40.909 SEIZURE DISORDER (HCC): Primary | ICD-10-CM

## 2018-08-27 DIAGNOSIS — G81.11 RIGHT SPASTIC HEMIPARESIS (HCC): ICD-10-CM

## 2018-08-27 PROCEDURE — 1036F TOBACCO NON-USER: CPT | Performed by: PSYCHIATRY & NEUROLOGY

## 2018-08-27 PROCEDURE — G8427 DOCREV CUR MEDS BY ELIG CLIN: HCPCS | Performed by: PSYCHIATRY & NEUROLOGY

## 2018-08-27 PROCEDURE — 99213 OFFICE O/P EST LOW 20 MIN: CPT | Performed by: PSYCHIATRY & NEUROLOGY

## 2018-08-27 PROCEDURE — G8417 CALC BMI ABV UP PARAM F/U: HCPCS | Performed by: PSYCHIATRY & NEUROLOGY

## 2018-08-27 NOTE — PROGRESS NOTES
NEUROLOGY OUT PATIENT FOLLOW UP NOTE:  9/35/80950:36 PM    Juanis Batista is here for follow up for   Patient Active Problem List   Diagnosis    Partial seizure ([address]    Right spastic hemiparesis ([address]    Hand pain, right    Autism    Menorrhagia with regular cycle    Keratosis, seborrheic    Patellar dislocation, left, initial encounter    Ms Juanis Batista is here for follow up of partial seizure. Comes in with her mom. No reported seizures. She is on Dilantin. She has no episodes of altered awareness. She is supervised at the home with her medications. ROS:  Respiratory : no cough, no hemoptysis. Cardiac: no chest pain. Skin: no rash    Allergies   Allergen Reactions    Seasonal        Current Outpatient Prescriptions:     medroxyPROGESTERone (DEPO-PROVERA) 150 MG/ML injection, Inject 150 mg into the muscle every 3 months, Disp: 1 mL, Rfl: 4    clotrimazole-betamethasone (LOTRISONE) 1-0.05 % cream, Apply topically 2 times daily. Till clear, Disp: 60 g, Rfl: 1    triamcinolone (KENALOG) 0.1 % cream, Apply topically 2 times daily. , Disp: 30 g, Rfl: 0    Elastic Bandages & Supports (KNEE BRACE) MISC, Disp:  1 hinged knee brace with patellar pad  Dx:  Patellar dislocation, Disp: 1 each, Rfl: 0    DILANTIN 100 MG ER capsule, TAKE THREE CAPSULES, DAILY AT BEDTIME, BY MOUTH., Disp: 90 capsule, Rfl: 0    cetirizine (ZYRTEC) 10 MG tablet, Take 1 tablet by mouth as needed for Allergies, Disp: 90 tablet, Rfl: 3    nystatin (MYCOSTATIN) 292757 UNIT/GM powder, Apply topically 2 times daily. , Disp: 1 Bottle, Rfl: 1    famotidine (PEPCID) 20 MG tablet, Take 20 mg by mouth daily, Disp: , Rfl:     dexlansoprazole (DEXILANT) 60 MG CPDR delayed release capsule, Take 1 capsule by mouth daily, Disp: 30 capsule, Rfl: 5    Calcium Carbonate-Vitamin D (CALCIUM 500 + D) 500-125 MG-UNIT TABS, Take 1 tablet by mouth 2 times daily, Disp: 60 tablet, Rfl: 5    linaclotide (LINZESS) 290 MCG CAPS capsule, Take 290

## 2018-10-01 ENCOUNTER — OFFICE VISIT (OUTPATIENT)
Dept: FAMILY MEDICINE CLINIC | Age: 38
End: 2018-10-01
Payer: MEDICARE

## 2018-10-01 VITALS
DIASTOLIC BLOOD PRESSURE: 82 MMHG | SYSTOLIC BLOOD PRESSURE: 122 MMHG | WEIGHT: 214.8 LBS | HEIGHT: 64 IN | BODY MASS INDEX: 36.67 KG/M2 | HEART RATE: 88 BPM

## 2018-10-01 DIAGNOSIS — Z23 NEED FOR PROPHYLACTIC VACCINATION AND INOCULATION AGAINST INFLUENZA: ICD-10-CM

## 2018-10-01 DIAGNOSIS — N30.90 CYSTITIS: Primary | ICD-10-CM

## 2018-10-01 LAB
BILIRUBIN, POC: ABNORMAL
BLOOD URINE, POC: ABNORMAL
CLARITY, POC: ABNORMAL
COLOR, POC: YELLOW
GLUCOSE URINE, POC: ABNORMAL
KETONES, POC: ABNORMAL
LEUKOCYTE EST, POC: ABNORMAL
NITRITE, POC: ABNORMAL
PH, POC: 5.5
PROTEIN, POC: ABNORMAL
SPECIFIC GRAVITY, POC: 1.02
UROBILINOGEN, POC: 0.2

## 2018-10-01 PROCEDURE — G8417 CALC BMI ABV UP PARAM F/U: HCPCS | Performed by: FAMILY MEDICINE

## 2018-10-01 PROCEDURE — G8510 SCR DEP NEG, NO PLAN REQD: HCPCS | Performed by: FAMILY MEDICINE

## 2018-10-01 PROCEDURE — 1036F TOBACCO NON-USER: CPT | Performed by: FAMILY MEDICINE

## 2018-10-01 PROCEDURE — G8482 FLU IMMUNIZE ORDER/ADMIN: HCPCS | Performed by: FAMILY MEDICINE

## 2018-10-01 PROCEDURE — 99213 OFFICE O/P EST LOW 20 MIN: CPT | Performed by: FAMILY MEDICINE

## 2018-10-01 PROCEDURE — 81002 URINALYSIS NONAUTO W/O SCOPE: CPT | Performed by: FAMILY MEDICINE

## 2018-10-01 PROCEDURE — G8427 DOCREV CUR MEDS BY ELIG CLIN: HCPCS | Performed by: FAMILY MEDICINE

## 2018-10-01 PROCEDURE — 90688 IIV4 VACCINE SPLT 0.5 ML IM: CPT | Performed by: FAMILY MEDICINE

## 2018-10-01 PROCEDURE — G0008 ADMIN INFLUENZA VIRUS VAC: HCPCS | Performed by: FAMILY MEDICINE

## 2018-10-01 RX ORDER — TRIAMCINOLONE ACETONIDE 1 MG/G
CREAM TOPICAL
Qty: 30 G | Refills: 1 | Status: SHIPPED | OUTPATIENT
Start: 2018-10-01 | End: 2019-10-22 | Stop reason: SDUPTHER

## 2018-10-01 RX ORDER — CIPROFLOXACIN 500 MG/1
500 TABLET, FILM COATED ORAL 2 TIMES DAILY
Qty: 14 TABLET | Refills: 0 | Status: SHIPPED | OUTPATIENT
Start: 2018-10-01 | End: 2018-10-08 | Stop reason: ALTCHOICE

## 2018-10-01 RX ORDER — NYSTATIN 100000 U/G
CREAM TOPICAL
Qty: 30 G | Refills: 1 | Status: SHIPPED | OUTPATIENT
Start: 2018-10-01 | End: 2019-09-19 | Stop reason: SDUPTHER

## 2018-10-01 ASSESSMENT — PATIENT HEALTH QUESTIONNAIRE - PHQ9: DEPRESSION UNABLE TO ASSESS: FUNCTIONAL CAPACITY MOTIVATION LIMITS ACCURACY

## 2018-10-01 NOTE — PROGRESS NOTES
Immunization(s) given during visit:    Immunizations     Name Date Dose Route    Influenza, Consuelo Estiven, 3 Years and older, IM (Fluzone 3 yrs and older or Afluria 5 yrs and older) 10/1/2018 0.5 mL Intramuscular    Site: Deltoid- Right    Lot: [id number]    [address]: 90910-868-21

## 2018-10-08 ENCOUNTER — HOSPITAL ENCOUNTER (OUTPATIENT)
Age: 38
Discharge: HOME | End: 2018-10-08
Payer: MEDICARE

## 2018-10-08 ENCOUNTER — OFFICE VISIT (OUTPATIENT)
Dept: FAMILY MEDICINE CLINIC | Age: 38
End: 2018-10-08
Payer: MEDICARE

## 2018-10-08 VITALS
BODY MASS INDEX: 35.94 KG/M2 | WEIGHT: 209.4 LBS | DIASTOLIC BLOOD PRESSURE: 82 MMHG | TEMPERATURE: 99 F | SYSTOLIC BLOOD PRESSURE: 132 MMHG | HEART RATE: 92 BPM

## 2018-10-08 DIAGNOSIS — G40.909 SEIZURE DISORDER (HCC): ICD-10-CM

## 2018-10-08 DIAGNOSIS — H66.011 ACUTE SUPPURATIVE OTITIS MEDIA OF RIGHT EAR WITH SPONTANEOUS RUPTURE OF TYMPANIC MEMBRANE, RECURRENCE NOT SPECIFIED: Primary | ICD-10-CM

## 2018-10-08 PROCEDURE — 1036F TOBACCO NON-USER: CPT | Performed by: FAMILY MEDICINE

## 2018-10-08 PROCEDURE — G8417 CALC BMI ABV UP PARAM F/U: HCPCS | Performed by: FAMILY MEDICINE

## 2018-10-08 PROCEDURE — G8427 DOCREV CUR MEDS BY ELIG CLIN: HCPCS | Performed by: FAMILY MEDICINE

## 2018-10-08 PROCEDURE — 99213 OFFICE O/P EST LOW 20 MIN: CPT | Performed by: FAMILY MEDICINE

## 2018-10-08 PROCEDURE — G8482 FLU IMMUNIZE ORDER/ADMIN: HCPCS | Performed by: FAMILY MEDICINE

## 2018-10-08 RX ORDER — AMOXICILLIN AND CLAVULANATE POTASSIUM 500; 125 MG/1; MG/1
1 TABLET, FILM COATED ORAL 2 TIMES DAILY
Qty: 20 TABLET | Refills: 0 | Status: SHIPPED | OUTPATIENT
Start: 2018-10-08 | End: 2018-10-15 | Stop reason: SINTOL

## 2018-10-15 DIAGNOSIS — H66.90 ACUTE OTITIS MEDIA, UNSPECIFIED OTITIS MEDIA TYPE: Primary | ICD-10-CM

## 2018-10-15 RX ORDER — SULFAMETHOXAZOLE AND TRIMETHOPRIM 800; 160 MG/1; MG/1
1 TABLET ORAL 2 TIMES DAILY
Qty: 20 TABLET | Refills: 0 | Status: SHIPPED | OUTPATIENT
Start: 2018-10-15 | End: 2018-10-25

## 2018-10-16 DIAGNOSIS — H66.90 ACUTE OTITIS MEDIA, UNSPECIFIED OTITIS MEDIA TYPE: Primary | ICD-10-CM

## 2018-10-16 RX ORDER — LOPERAMIDE HYDROCHLORIDE 2 MG/1
2 CAPSULE ORAL 4 TIMES DAILY PRN
Qty: 15 CAPSULE | Refills: 0 | Status: SHIPPED | OUTPATIENT
Start: 2018-10-16 | End: 2018-10-26

## 2018-10-17 ENCOUNTER — TELEPHONE (OUTPATIENT)
Dept: FAMILY MEDICINE CLINIC | Age: 38
End: 2018-10-17

## 2018-10-24 ENCOUNTER — HOSPITAL ENCOUNTER (OUTPATIENT)
Age: 38
Discharge: HOME | End: 2018-10-24
Payer: MEDICARE

## 2018-10-24 DIAGNOSIS — G40.909 SEIZURE DISORDER (HCC): ICD-10-CM

## 2018-10-24 LAB
ALBUMIN SERPL-MCNC: 4.3 G/DL (ref 3.5–5.1)
ALP BLD-CCNC: 134 U/L (ref 38–126)
ALT SERPL-CCNC: 21 U/L (ref 11–66)
AST SERPL-CCNC: 13 U/L (ref 5–40)
BASOPHILS # BLD: 0.4 %
BASOPHILS ABSOLUTE: 0 THOU/MM3 (ref 0–0.1)
BILIRUB SERPL-MCNC: < 0.2 MG/DL (ref 0.3–1.2)
BILIRUBIN DIRECT: < 0.2 MG/DL (ref 0–0.3)
EOSINOPHIL # BLD: 1.7 %
EOSINOPHILS ABSOLUTE: 0.2 THOU/MM3 (ref 0–0.4)
ERYTHROCYTE [DISTWIDTH] IN BLOOD BY AUTOMATED COUNT: 13.2 % (ref 11.5–14.5)
ERYTHROCYTE [DISTWIDTH] IN BLOOD BY AUTOMATED COUNT: 43.1 FL (ref 35–45)
HCT VFR BLD CALC: 44.6 % (ref 37–47)
HEMOGLOBIN: 14.6 GM/DL (ref 12–16)
IMMATURE GRANS (ABS): 0.06 THOU/MM3 (ref 0–0.07)
IMMATURE GRANULOCYTES: 0.5 %
LYMPHOCYTES # BLD: 17.4 %
LYMPHOCYTES ABSOLUTE: 2.1 THOU/MM3 (ref 1–4.8)
MCH RBC QN AUTO: 29.1 PG (ref 26–33)
MCHC RBC AUTO-ENTMCNC: 32.7 GM/DL (ref 32.2–35.5)
MCV RBC AUTO: 89 FL (ref 81–99)
MONOCYTES # BLD: 7.2 %
MONOCYTES ABSOLUTE: 0.9 THOU/MM3 (ref 0.4–1.3)
NUCLEATED RED BLOOD CELLS: 0 /100 WBC
PLATELET # BLD: 384 THOU/MM3 (ref 130–400)
PMV BLD AUTO: 9.9 FL (ref 9.4–12.4)
RBC # BLD: 5.01 MILL/MM3 (ref 4.2–5.4)
SEG NEUTROPHILS: 72.8 %
SEGMENTED NEUTROPHILS ABSOLUTE COUNT: 9 THOU/MM3 (ref 1.8–7.7)
TOTAL PROTEIN: 7.2 G/DL (ref 6.1–8)
WBC # BLD: 12.3 THOU/MM3 (ref 4.8–10.8)

## 2018-10-24 PROCEDURE — 85025 COMPLETE CBC W/AUTO DIFF WBC: CPT

## 2018-10-24 PROCEDURE — 80076 HEPATIC FUNCTION PANEL: CPT

## 2018-10-24 PROCEDURE — 80186 ASSAY OF PHENYTOIN FREE: CPT

## 2018-10-24 PROCEDURE — 80185 ASSAY OF PHENYTOIN TOTAL: CPT

## 2018-10-24 PROCEDURE — 36415 COLL VENOUS BLD VENIPUNCTURE: CPT

## 2018-10-28 LAB — PHENYTOIN FREE: NORMAL

## 2018-11-06 ENCOUNTER — TELEPHONE (OUTPATIENT)
Dept: FAMILY MEDICINE CLINIC | Age: 38
End: 2018-11-06

## 2018-11-06 RX ORDER — METHYLPREDNISOLONE 4 MG/1
TABLET ORAL
Qty: 1 KIT | Refills: 0 | Status: SHIPPED | OUTPATIENT
Start: 2018-11-06 | End: 2018-11-06 | Stop reason: SDUPTHER

## 2018-11-06 RX ORDER — METHYLPREDNISOLONE 4 MG/1
TABLET ORAL
Qty: 1 KIT | Refills: 0 | Status: SHIPPED | OUTPATIENT
Start: 2018-11-06 | End: 2018-11-12

## 2018-11-06 NOTE — TELEPHONE ENCOUNTER
Left message for mom that rx was sent in to [address] [place] [address] in [facility]. Advised to call if has any questions.

## 2018-11-12 ENCOUNTER — NURSE ONLY (OUTPATIENT)
Dept: FAMILY MEDICINE CLINIC | Age: 38
End: 2018-11-12
Payer: MEDICARE

## 2018-11-12 DIAGNOSIS — N92.0 MENORRHAGIA WITH REGULAR CYCLE: Primary | Chronic | ICD-10-CM

## 2018-11-12 PROCEDURE — 96372 THER/PROPH/DIAG INJ SC/IM: CPT | Performed by: FAMILY MEDICINE

## 2018-11-12 RX ORDER — MEDROXYPROGESTERONE ACETATE 150 MG/ML
150 INJECTION, SUSPENSION INTRAMUSCULAR ONCE
Status: COMPLETED | OUTPATIENT
Start: 2018-11-12 | End: 2018-11-12

## 2018-11-12 RX ADMIN — MEDROXYPROGESTERONE ACETATE 150 MG: 150 INJECTION, SUSPENSION INTRAMUSCULAR at 14:30

## 2018-12-20 DIAGNOSIS — R56.9 PARTIAL SEIZURE (HCC): ICD-10-CM

## 2018-12-21 RX ORDER — PHENYTOIN SODIUM 100 MG/1
CAPSULE, EXTENDED RELEASE ORAL
Qty: 93 CAPSULE | Refills: 0 | Status: SHIPPED | OUTPATIENT
Start: 2018-12-21 | End: 2019-03-11 | Stop reason: SDUPTHER

## 2019-02-12 ENCOUNTER — OFFICE VISIT (OUTPATIENT)
Dept: FAMILY MEDICINE CLINIC | Age: 39
End: 2019-02-12
Payer: MEDICARE

## 2019-02-12 VITALS
SYSTOLIC BLOOD PRESSURE: 122 MMHG | HEART RATE: 88 BPM | HEIGHT: 64 IN | WEIGHT: 204.8 LBS | DIASTOLIC BLOOD PRESSURE: 82 MMHG | BODY MASS INDEX: 34.97 KG/M2

## 2019-02-12 DIAGNOSIS — N92.0 MENORRHAGIA WITH REGULAR CYCLE: Chronic | ICD-10-CM

## 2019-02-12 DIAGNOSIS — G81.11 RIGHT SPASTIC HEMIPARESIS (HCC): ICD-10-CM

## 2019-02-12 DIAGNOSIS — M54.5 ACUTE BILATERAL LOW BACK PAIN, WITH SCIATICA PRESENCE UNSPECIFIED: Primary | ICD-10-CM

## 2019-02-12 LAB
BILIRUBIN, POC: NORMAL
BLOOD URINE, POC: NORMAL
CLARITY, POC: CLEAR
COLOR, POC: YELLOW
GLUCOSE URINE, POC: NORMAL
KETONES, POC: NORMAL
LEUKOCYTE EST, POC: NORMAL
NITRITE, POC: NORMAL
PH, POC: 6
PROTEIN, POC: NORMAL
SPECIFIC GRAVITY, POC: 1.01
UROBILINOGEN, POC: 0.2

## 2019-02-12 PROCEDURE — 96372 THER/PROPH/DIAG INJ SC/IM: CPT | Performed by: FAMILY MEDICINE

## 2019-02-12 PROCEDURE — 81002 URINALYSIS NONAUTO W/O SCOPE: CPT | Performed by: FAMILY MEDICINE

## 2019-02-12 RX ORDER — MEDROXYPROGESTERONE ACETATE 150 MG/ML
150 INJECTION, SUSPENSION INTRAMUSCULAR ONCE
Qty: 1 ML | Refills: 3
Start: 2019-02-12 | End: 2019-02-12 | Stop reason: CLARIF

## 2019-02-12 RX ORDER — MELOXICAM 15 MG/1
15 TABLET ORAL DAILY PRN
Qty: 30 TABLET | Refills: 0 | Status: SHIPPED | OUTPATIENT
Start: 2019-02-12 | End: 2019-02-26 | Stop reason: ALTCHOICE

## 2019-02-12 RX ORDER — MEDROXYPROGESTERONE ACETATE 150 MG/ML
150 INJECTION, SUSPENSION INTRAMUSCULAR ONCE
Status: COMPLETED | OUTPATIENT
Start: 2019-02-12 | End: 2019-02-12

## 2019-02-12 RX ADMIN — MEDROXYPROGESTERONE ACETATE 150 MG: 150 INJECTION, SUSPENSION INTRAMUSCULAR at 11:26

## 2019-02-12 ASSESSMENT — PATIENT HEALTH QUESTIONNAIRE - PHQ9: DEPRESSION UNABLE TO ASSESS: FUNCTIONAL CAPACITY MOTIVATION LIMITS ACCURACY

## 2019-02-25 ENCOUNTER — TELEPHONE (OUTPATIENT)
Dept: FAMILY MEDICINE CLINIC | Age: 39
End: 2019-02-25

## 2019-02-25 DIAGNOSIS — M25.559 ARTHRALGIA OF HIP, UNSPECIFIED LATERALITY: Primary | ICD-10-CM

## 2019-02-26 RX ORDER — NAPROXEN 500 MG/1
500 TABLET ORAL 2 TIMES DAILY WITH MEALS
Qty: 60 TABLET | Refills: 1 | Status: SHIPPED | OUTPATIENT
Start: 2019-02-26 | End: 2019-04-23 | Stop reason: SDUPTHER

## 2019-02-27 ENCOUNTER — HOSPITAL ENCOUNTER (OUTPATIENT)
Dept: GENERAL RADIOLOGY | Age: 39
Discharge: HOME | End: 2019-02-27
Payer: MEDICARE

## 2019-02-27 ENCOUNTER — TELEPHONE (OUTPATIENT)
Dept: FAMILY MEDICINE CLINIC | Age: 39
End: 2019-02-27

## 2019-02-27 ENCOUNTER — HOSPITAL ENCOUNTER (OUTPATIENT)
Age: 39
Discharge: HOME | End: 2019-02-27
Payer: MEDICARE

## 2019-02-27 DIAGNOSIS — M25.559 ARTHRALGIA OF HIP, UNSPECIFIED LATERALITY: ICD-10-CM

## 2019-02-27 PROCEDURE — 73522 X-RAY EXAM HIPS BI 3-4 VIEWS: CPT

## 2019-02-27 PROCEDURE — 72100 X-RAY EXAM L-S SPINE 2/3 VWS: CPT

## 2019-03-04 ENCOUNTER — TELEPHONE (OUTPATIENT)
Dept: FAMILY MEDICINE CLINIC | Age: 39
End: 2019-03-04

## 2019-03-04 DIAGNOSIS — M79.18 MUSCULOSKELETAL PAIN: Primary | ICD-10-CM

## 2019-03-04 RX ORDER — ACETAMINOPHEN AND CODEINE PHOSPHATE 300; 30 MG/1; MG/1
1 TABLET ORAL EVERY 8 HOURS PRN
Qty: 20 TABLET | Refills: 0 | Status: SHIPPED | OUTPATIENT
Start: 2019-03-04 | End: 2019-03-11

## 2019-03-11 DIAGNOSIS — R56.9 PARTIAL SEIZURE (HCC): ICD-10-CM

## 2019-03-11 RX ORDER — PHENYTOIN SODIUM 100 MG/1
CAPSULE, EXTENDED RELEASE ORAL
Qty: 90 CAPSULE | Refills: 0 | Status: SHIPPED | OUTPATIENT
Start: 2019-03-11 | End: 2019-06-18 | Stop reason: SDUPTHER

## 2019-03-22 ENCOUNTER — HOSPITAL ENCOUNTER (OUTPATIENT)
Dept: NUCLEAR MEDICINE | Age: 39
Discharge: HOME | End: 2019-03-22
Payer: MEDICARE

## 2019-03-22 ENCOUNTER — HOSPITAL ENCOUNTER (OUTPATIENT)
Dept: CT IMAGING | Age: 39
Discharge: HOME | End: 2019-03-22
Payer: MEDICARE

## 2019-03-22 DIAGNOSIS — S73.102A HIP SPRAIN, LEFT, INITIAL ENCOUNTER: ICD-10-CM

## 2019-03-22 DIAGNOSIS — M54.50 ACUTE BILATERAL LOW BACK PAIN WITHOUT SCIATICA: ICD-10-CM

## 2019-03-22 DIAGNOSIS — M23.8X2 DERANGEMENT OF LEFT KNEE LIGAMENT: ICD-10-CM

## 2019-03-22 DIAGNOSIS — M23.8X9 OTHER INTERNAL DERANGEMENTS OF UNSPECIFIED KNEE: ICD-10-CM

## 2019-03-22 PROCEDURE — A9503 TC99M MEDRONATE: HCPCS | Performed by: ORTHOPAEDIC SURGERY

## 2019-03-22 PROCEDURE — 78306 BONE IMAGING WHOLE BODY: CPT

## 2019-03-22 PROCEDURE — 3430000000 HC RX DIAGNOSTIC RADIOPHARMACEUTICAL: Performed by: ORTHOPAEDIC SURGERY

## 2019-03-22 PROCEDURE — 73700 CT LOWER EXTREMITY W/O DYE: CPT

## 2019-03-22 RX ORDER — TC 99M MEDRONATE 20 MG/10ML
25.5 INJECTION, POWDER, LYOPHILIZED, FOR SOLUTION INTRAVENOUS
Status: COMPLETED | OUTPATIENT
Start: 2019-03-22 | End: 2019-03-22

## 2019-03-22 RX ADMIN — TC 99M MEDRONATE 25.5 MILLICURIE: 20 INJECTION, POWDER, LYOPHILIZED, FOR SOLUTION INTRAVENOUS at 10:05

## 2019-04-23 RX ORDER — NAPROXEN 500 MG/1
500 TABLET ORAL 2 TIMES DAILY WITH MEALS
Qty: 60 TABLET | Refills: 1 | Status: SHIPPED | OUTPATIENT
Start: 2019-04-23 | End: 2019-09-19

## 2019-04-23 NOTE — TELEPHONE ENCOUNTER
Luis Conklin called requesting a refill on the following medications:  Requested Prescriptions     Pending Prescriptions Disp Refills    naproxen (NAPROSYN) 500 MG tablet 60 tablet 1     Sig: Take 1 tablet by mouth 2 times daily (with meals)       Date of last visit: 2/12/2019  Date of next visit (if applicable):Visit date not found  Date of last refill: #60 x 1 on 2/26/2019  Pharmacy Name: [facility]      Thanks,  Yesica Lubin, RN

## 2019-05-13 ENCOUNTER — TELEPHONE (OUTPATIENT)
Dept: FAMILY MEDICINE CLINIC | Age: 39
End: 2019-05-13

## 2019-05-13 ENCOUNTER — NURSE ONLY (OUTPATIENT)
Dept: FAMILY MEDICINE CLINIC | Age: 39
End: 2019-05-13
Payer: MEDICARE

## 2019-05-13 DIAGNOSIS — N92.0 MENORRHAGIA WITH REGULAR CYCLE: Primary | ICD-10-CM

## 2019-05-13 PROCEDURE — 96372 THER/PROPH/DIAG INJ SC/IM: CPT | Performed by: FAMILY MEDICINE

## 2019-05-13 RX ORDER — MEDROXYPROGESTERONE ACETATE 150 MG/ML
150 INJECTION, SUSPENSION INTRAMUSCULAR ONCE
Status: COMPLETED | OUTPATIENT
Start: 2019-05-13 | End: 2019-05-13

## 2019-05-13 RX ADMIN — MEDROXYPROGESTERONE ACETATE 150 MG: 150 INJECTION, SUSPENSION INTRAMUSCULAR at 16:09

## 2019-05-13 NOTE — PROGRESS NOTES
Administrations This Visit     medroxyPROGESTERone (DEPO-PROVERA) injection 150 mg     Admin Date  05/13/2019  16:09 Action  Given Dose  150 mg Route  Intramuscular Site  Dorsogluteal Left Administered By  Jarrod Pagan CMA ([address] [place] [address])    Ordering Provider:  Dora Galvin MD    Patient Supplied?:  Yes

## 2019-05-15 NOTE — TELEPHONE ENCOUNTER
Tucker Beebe called requesting a refill on the following medications:  Requested Prescriptions     Pending Prescriptions Disp Refills    Calcium Carbonate-Vitamin D 500-125 MG-UNIT TABS 60 tablet 5     Sig: TAKE ONE TABLET, TWO TIMES A DAY, BY MOUTH.        Date of last visit: 2/12/2019  Date of next visit (if applicable):Visit date not found  Pharmacy Name: [facility]      Thanks,  Nan Galindo, [address] ([address] [place] [address])

## 2019-06-18 DIAGNOSIS — R56.9 PARTIAL SEIZURE (HCC): ICD-10-CM

## 2019-06-18 RX ORDER — PHENYTOIN SODIUM 100 MG/1
CAPSULE, EXTENDED RELEASE ORAL
Qty: 90 CAPSULE | Refills: 3 | Status: SHIPPED | OUTPATIENT
Start: 2019-06-18 | End: 2019-08-19 | Stop reason: SDUPTHER

## 2019-06-18 NOTE — TELEPHONE ENCOUNTER
Mamie Aldana called requesting a refill on the following medications:  Requested Prescriptions     Pending Prescriptions Disp Refills    DILANTIN 100 MG ER capsule [Pharmacy Med Name: DILANTIN 100 MG CAPSULE] 90 capsule 3     Sig: TAKE THREE CAPSULES, DAILY AT BEDTIME, BY MOUTH.        Date of last visit: 2/12/2019  Date of next visit (if applicable):Visit date not found  Date of last refill:   Pharmacy Name:       Samia Garcia, [address] [place] [address]

## 2019-08-12 ENCOUNTER — NURSE ONLY (OUTPATIENT)
Dept: FAMILY MEDICINE CLINIC | Age: 39
End: 2019-08-12
Payer: MEDICARE

## 2019-08-12 DIAGNOSIS — N92.0 MENORRHAGIA WITH REGULAR CYCLE: Primary | ICD-10-CM

## 2019-08-12 PROCEDURE — 96372 THER/PROPH/DIAG INJ SC/IM: CPT | Performed by: FAMILY MEDICINE

## 2019-08-12 RX ORDER — MEDROXYPROGESTERONE ACETATE 150 MG/ML
150 INJECTION, SUSPENSION INTRAMUSCULAR ONCE
Status: COMPLETED | OUTPATIENT
Start: 2019-08-12 | End: 2019-08-12

## 2019-08-12 RX ADMIN — MEDROXYPROGESTERONE ACETATE 150 MG: 150 INJECTION, SUSPENSION INTRAMUSCULAR at 16:20

## 2019-08-19 ENCOUNTER — OFFICE VISIT (OUTPATIENT)
Dept: NEUROLOGY | Age: 39
End: 2019-08-19
Payer: MEDICARE

## 2019-08-19 VITALS
HEIGHT: 64 IN | BODY MASS INDEX: 36.88 KG/M2 | HEART RATE: 66 BPM | WEIGHT: 216 LBS | SYSTOLIC BLOOD PRESSURE: 118 MMHG | DIASTOLIC BLOOD PRESSURE: 76 MMHG

## 2019-08-19 DIAGNOSIS — R56.9 PARTIAL SEIZURE (HCC): ICD-10-CM

## 2019-08-19 PROCEDURE — G8417 CALC BMI ABV UP PARAM F/U: HCPCS | Performed by: PSYCHIATRY & NEUROLOGY

## 2019-08-19 PROCEDURE — 99213 OFFICE O/P EST LOW 20 MIN: CPT | Performed by: PSYCHIATRY & NEUROLOGY

## 2019-08-19 PROCEDURE — G8427 DOCREV CUR MEDS BY ELIG CLIN: HCPCS | Performed by: PSYCHIATRY & NEUROLOGY

## 2019-08-19 PROCEDURE — 1036F TOBACCO NON-USER: CPT | Performed by: PSYCHIATRY & NEUROLOGY

## 2019-08-19 RX ORDER — PHENYTOIN SODIUM 100 MG/1
CAPSULE, EXTENDED RELEASE ORAL
Qty: 90 CAPSULE | Refills: 3 | Status: SHIPPED | OUTPATIENT
Start: 2019-08-19 | End: 2019-11-18 | Stop reason: SDUPTHER

## 2019-08-28 ENCOUNTER — OFFICE VISIT (OUTPATIENT)
Dept: FAMILY MEDICINE CLINIC | Age: 39
End: 2019-08-28
Payer: MEDICARE

## 2019-08-28 VITALS
HEIGHT: 64 IN | DIASTOLIC BLOOD PRESSURE: 82 MMHG | HEART RATE: 80 BPM | SYSTOLIC BLOOD PRESSURE: 130 MMHG | WEIGHT: 214.2 LBS | BODY MASS INDEX: 36.57 KG/M2 | TEMPERATURE: 98.8 F

## 2019-08-28 DIAGNOSIS — B34.9 VIRAL ILLNESS: Primary | ICD-10-CM

## 2019-08-28 DIAGNOSIS — R33.9 URINARY RETENTION: ICD-10-CM

## 2019-08-28 LAB
BILIRUBIN, POC: NORMAL
BLOOD URINE, POC: NORMAL
CLARITY, POC: CLEAR
COLOR, POC: YELLOW
GLUCOSE URINE, POC: NORMAL
KETONES, POC: NORMAL
LEUKOCYTE EST, POC: NORMAL
NITRITE, POC: NORMAL
PH, POC: 7
PROTEIN, POC: NORMAL
SPECIFIC GRAVITY, POC: 1.01
UROBILINOGEN, POC: 0.2

## 2019-08-28 PROCEDURE — 99213 OFFICE O/P EST LOW 20 MIN: CPT | Performed by: FAMILY MEDICINE

## 2019-08-28 PROCEDURE — 81002 URINALYSIS NONAUTO W/O SCOPE: CPT | Performed by: FAMILY MEDICINE

## 2019-08-28 PROCEDURE — G8417 CALC BMI ABV UP PARAM F/U: HCPCS | Performed by: FAMILY MEDICINE

## 2019-08-28 PROCEDURE — G8427 DOCREV CUR MEDS BY ELIG CLIN: HCPCS | Performed by: FAMILY MEDICINE

## 2019-08-28 PROCEDURE — 1036F TOBACCO NON-USER: CPT | Performed by: FAMILY MEDICINE

## 2019-09-03 ENCOUNTER — HOSPITAL ENCOUNTER (OUTPATIENT)
Age: 39
Discharge: HOME | End: 2019-09-03
Payer: MEDICARE

## 2019-09-03 ENCOUNTER — HOSPITAL ENCOUNTER (OUTPATIENT)
Dept: GENERAL RADIOLOGY | Age: 39
Discharge: HOME | End: 2019-09-03
Payer: MEDICARE

## 2019-09-03 DIAGNOSIS — R52 PAIN: ICD-10-CM

## 2019-09-03 PROCEDURE — 72170 X-RAY EXAM OF PELVIS: CPT

## 2019-09-09 RX ORDER — CETIRIZINE HYDROCHLORIDE 10 MG/1
10 TABLET ORAL PRN
Qty: 90 TABLET | Refills: 3 | Status: SHIPPED | OUTPATIENT
Start: 2019-09-09 | End: 2020-10-13 | Stop reason: SDUPTHER

## 2019-09-14 NOTE — PROGRESS NOTES
Vitals:   ? 08/19/19 1521   BP: 118/76   Site: Left Upper Arm   Position: Sitting   Cuff Size: Large Adult   Pulse: 66   Weight: 216 lb (98 kg)   Height: 5' 4\" (1.626 m)   General Appearance: ? She is non verbral. Mumbles. well developed, well nourished, obese and non verbal. no icterus  Neck:?There is no carotid bruits. The Neck is supple. Neuro:?CN 2-12 grossly intact with no focal deficits. Power 5/5 throughout, ?right wrist flexion spasticity. ? Reflexes are symmetric. Cerebellar exam is Intact. Sensory exam is intact to light touch. ?Gait is intact. No lower extremity edema. ?  ? DATA:  Results for orders placed or performed in visit on 02/12/19   POCT Urinalysis no Micro   Result Value Ref Range   ? Color, UA yellow ? ? Clarity, UA clear ? ? Glucose, UA POC neg ?   ? Bilirubin, UA neg ?   ? Ketones, UA neg ? ? Spec Grav, UA 1.015 ?   ? Blood, UA POC neg ? ? pH, UA 6.0 ?   ? Protein, UA POC neg ?   ? Urobilinogen, UA 0.2 ?   ? Leukocytes, UA neg ?   ? Nitrite, UA neg ?   ?  ?  ? Assessment:  ? Diagnosis Orders   1. Partial seizure (HCC)  phenytoin (DILANTIN) 100 MG ER capsule   ? Phenytoin level, total   ? CBC With Auto Differential   ? Hepatic Function Panel   ? She is doing well. No reported spells or seizure. She is on dilantin. No side effects noted. She is compliant. She is taking calcium and vitamin D supplements. After detailed discussion with patient's care giver we agreed on the following plan. ?  ?  Plan:  1. Continue Dilantin. Refills given. 2. Continue calcium and vitamin D.   3. Report any new events. Call if any questions or concerns. 4. CBC, Dilantin level, hepatic panel ordered. 5. ?Follow up in 12 months or sooner if needed. ? Please call if any questions.       Benito Sood MD

## 2019-09-19 ENCOUNTER — OFFICE VISIT (OUTPATIENT)
Dept: FAMILY MEDICINE CLINIC | Age: 39
End: 2019-09-19
Payer: MEDICARE

## 2019-09-19 VITALS
DIASTOLIC BLOOD PRESSURE: 78 MMHG | WEIGHT: 217 LBS | SYSTOLIC BLOOD PRESSURE: 130 MMHG | BODY MASS INDEX: 37.25 KG/M2 | HEART RATE: 80 BPM

## 2019-09-19 DIAGNOSIS — L73.9 ACUTE FOLLICULITIS: Primary | ICD-10-CM

## 2019-09-19 PROCEDURE — G8510 SCR DEP NEG, NO PLAN REQD: HCPCS | Performed by: FAMILY MEDICINE

## 2019-09-19 PROCEDURE — 99213 OFFICE O/P EST LOW 20 MIN: CPT | Performed by: FAMILY MEDICINE

## 2019-09-19 PROCEDURE — G8427 DOCREV CUR MEDS BY ELIG CLIN: HCPCS | Performed by: FAMILY MEDICINE

## 2019-09-19 PROCEDURE — 1036F TOBACCO NON-USER: CPT | Performed by: FAMILY MEDICINE

## 2019-09-19 PROCEDURE — G8417 CALC BMI ABV UP PARAM F/U: HCPCS | Performed by: FAMILY MEDICINE

## 2019-09-19 RX ORDER — NYSTATIN 100000 U/G
CREAM TOPICAL
Qty: 30 G | Refills: 1 | Status: SHIPPED | OUTPATIENT
Start: 2019-09-19 | End: 2020-02-10 | Stop reason: ALTCHOICE

## 2019-09-19 RX ORDER — DOXYCYCLINE HYCLATE 100 MG
100 TABLET ORAL 2 TIMES DAILY
Qty: 14 TABLET | Refills: 0 | Status: SHIPPED | OUTPATIENT
Start: 2019-09-19 | End: 2019-09-26

## 2019-09-19 RX ORDER — MEDROXYPROGESTERONE ACETATE 150 MG/ML
150 INJECTION, SUSPENSION INTRAMUSCULAR
Qty: 1 ML | Refills: 4 | Status: SHIPPED | OUTPATIENT
Start: 2019-09-19 | End: 2020-10-13 | Stop reason: SDUPTHER

## 2019-09-19 ASSESSMENT — ENCOUNTER SYMPTOMS
GASTROINTESTINAL NEGATIVE: 1
RESPIRATORY NEGATIVE: 1

## 2019-09-19 ASSESSMENT — PATIENT HEALTH QUESTIONNAIRE - PHQ9: DEPRESSION UNABLE TO ASSESS: FUNCTIONAL CAPACITY MOTIVATION LIMITS ACCURACY

## 2019-09-19 NOTE — PROGRESS NOTES
[facility] PROFESSIONAL SERVS  [facility]  [address] [facility]  Dept: [phone]  Dept Fax: [phone]: [phone]    Visit Date: 9/19/2019    Torrie Suarez is a 35 y. o.female who presents today for:   Chief Complaint   Patient presents with    Rash     On stomach and legs         HPI:      Here with house aid. [place] Story may not be complete from the Aide. Reports recurrent rash under breast and lower abd. Used Monolog cream.   Today it seems to resolve quickly which goes against it being fungal.   Also having diffuse and discrete red rash of extremities and ABD wall. Current Outpatient Medications   Medication Sig Dispense Refill    diclofenac (VOLTAREN) 50 MG EC tablet Take 50 mg by mouth 2 times daily      medroxyPROGESTERone (DEPO-PROVERA) 150 MG/ML injection Inject 150 mg into the muscle every 3 months 1 mL 4    nystatin (MYCOSTATIN) 682806 UNIT/GM cream Apply topically 2 times daily. 30 g 1    doxycycline hyclate (VIBRA-TABS) 100 MG tablet Take 1 tablet by mouth 2 times daily for 7 days 14 tablet 0    cetirizine (ZYRTEC) 10 MG tablet Take 1 tablet by mouth as needed for Allergies 90 tablet 3    phenytoin (DILANTIN) 100 MG ER capsule TAKE THREE CAPSULES, DAILY AT BEDTIME, BY MOUTH. 90 capsule 3    Calcium Carbonate-Vitamin D 500-125 MG-UNIT TABS TAKE ONE TABLET, TWO TIMES A DAY, BY MOUTH. 60 tablet 5    triamcinolone (KENALOG) 0.1 % cream Apply topically 2 times daily.  30 g 1    Elastic Bandages & Supports (KNEE BRACE) MISC Disp:  1 hinged knee brace with patellar pad   Dx:  Patellar dislocation 1 each 0    famotidine (PEPCID) 20 MG tablet Take 40 mg by mouth daily       dexlansoprazole (DEXILANT) 60 MG CPDR delayed release capsule Take 1 capsule by mouth daily 30 capsule 5    linaclotide (LINZESS) 290 MCG CAPS capsule Take 290 mcg by mouth every morning (before breakfast)       No current

## 2019-10-01 ENCOUNTER — HOSPITAL ENCOUNTER (OUTPATIENT)
Age: 39
Discharge: HOME | End: 2019-10-01
Payer: MEDICARE

## 2019-10-01 DIAGNOSIS — R56.9 PARTIAL SEIZURE (HCC): ICD-10-CM

## 2019-10-01 LAB
BASOPHILS # BLD: 0.5 %
BASOPHILS ABSOLUTE: 0.1 THOU/MM3 (ref 0–0.1)
EOSINOPHIL # BLD: 3.7 %
EOSINOPHILS ABSOLUTE: 0.4 THOU/MM3 (ref 0–0.4)
ERYTHROCYTE [DISTWIDTH] IN BLOOD BY AUTOMATED COUNT: 13.2 % (ref 11.5–14.5)
ERYTHROCYTE [DISTWIDTH] IN BLOOD BY AUTOMATED COUNT: 42.9 FL (ref 35–45)
HCT VFR BLD CALC: 43.3 % (ref 37–47)
HEMOGLOBIN: 14 GM/DL (ref 12–16)
IMMATURE GRANS (ABS): 0.08 THOU/MM3 (ref 0–0.07)
IMMATURE GRANULOCYTES: 0.7 %
LYMPHOCYTES # BLD: 16.7 %
LYMPHOCYTES ABSOLUTE: 1.9 THOU/MM3 (ref 1–4.8)
MCH RBC QN AUTO: 28.8 PG (ref 26–33)
MCHC RBC AUTO-ENTMCNC: 32.3 GM/DL (ref 32.2–35.5)
MCV RBC AUTO: 89.1 FL (ref 81–99)
MONOCYTES # BLD: 7.8 %
MONOCYTES ABSOLUTE: 0.9 THOU/MM3 (ref 0.4–1.3)
NUCLEATED RED BLOOD CELLS: 0 /100 WBC
PLATELET # BLD: 355 THOU/MM3 (ref 130–400)
PMV BLD AUTO: 9.6 FL (ref 9.4–12.4)
RBC # BLD: 4.86 MILL/MM3 (ref 4.2–5.4)
SEG NEUTROPHILS: 70.6 %
SEGMENTED NEUTROPHILS ABSOLUTE COUNT: 8.1 THOU/MM3 (ref 1.8–7.7)
WBC # BLD: 11.5 THOU/MM3 (ref 4.8–10.8)

## 2019-10-01 PROCEDURE — 80185 ASSAY OF PHENYTOIN TOTAL: CPT

## 2019-10-01 PROCEDURE — 80076 HEPATIC FUNCTION PANEL: CPT

## 2019-10-01 PROCEDURE — 85025 COMPLETE CBC W/AUTO DIFF WBC: CPT

## 2019-10-01 PROCEDURE — 36415 COLL VENOUS BLD VENIPUNCTURE: CPT

## 2019-10-02 ENCOUNTER — TELEPHONE (OUTPATIENT)
Dept: NEUROLOGY | Age: 39
End: 2019-10-02

## 2019-10-02 DIAGNOSIS — R56.9 PARTIAL SEIZURE (HCC): Primary | ICD-10-CM

## 2019-10-02 LAB
ALBUMIN SERPL-MCNC: 4.3 G/DL (ref 3.5–5.1)
ALP BLD-CCNC: 122 U/L (ref 38–126)
ALT SERPL-CCNC: 13 U/L (ref 11–66)
AST SERPL-CCNC: 10 U/L (ref 5–40)
BILIRUB SERPL-MCNC: < 0.2 MG/DL (ref 0.3–1.2)
BILIRUBIN DIRECT: < 0.2 MG/DL (ref 0–0.3)
PHENYTOIN LEVEL: 4.2 UG/ML (ref 6–18)
TOTAL PROTEIN: 7 G/DL (ref 6.1–8)

## 2019-10-14 ENCOUNTER — HOSPITAL ENCOUNTER (OUTPATIENT)
Age: 39
Discharge: HOME | End: 2019-10-14
Payer: MEDICARE

## 2019-10-14 DIAGNOSIS — R56.9 PARTIAL SEIZURE (HCC): ICD-10-CM

## 2019-10-14 LAB — PHENYTOIN LEVEL: 5.3 UG/ML (ref 6–18)

## 2019-10-14 PROCEDURE — 36415 COLL VENOUS BLD VENIPUNCTURE: CPT

## 2019-10-14 PROCEDURE — 80185 ASSAY OF PHENYTOIN TOTAL: CPT

## 2019-10-22 RX ORDER — TRIAMCINOLONE ACETONIDE 1 MG/G
CREAM TOPICAL
Qty: 60 G | Refills: 2 | Status: SHIPPED | OUTPATIENT
Start: 2019-10-22 | End: 2020-10-13 | Stop reason: SDUPTHER

## 2019-11-04 ENCOUNTER — TELEPHONE (OUTPATIENT)
Dept: FAMILY MEDICINE CLINIC | Age: 39
End: 2019-11-04

## 2019-11-04 RX ORDER — SCOLOPAMINE TRANSDERMAL SYSTEM 1 MG/1
1 PATCH, EXTENDED RELEASE TRANSDERMAL
Qty: 3 PATCH | Refills: 0 | Status: SHIPPED | OUTPATIENT
Start: 2019-11-04 | End: 2019-11-13

## 2019-11-11 ENCOUNTER — NURSE ONLY (OUTPATIENT)
Dept: FAMILY MEDICINE CLINIC | Age: 39
End: 2019-11-11
Payer: MEDICARE

## 2019-11-11 DIAGNOSIS — N92.0 MENORRHAGIA WITH REGULAR CYCLE: Primary | ICD-10-CM

## 2019-11-11 PROCEDURE — 96372 THER/PROPH/DIAG INJ SC/IM: CPT | Performed by: FAMILY MEDICINE

## 2019-11-11 RX ORDER — MEDROXYPROGESTERONE ACETATE 150 MG/ML
150 INJECTION, SUSPENSION INTRAMUSCULAR ONCE
Status: COMPLETED | OUTPATIENT
Start: 2019-11-11 | End: 2019-11-11

## 2019-11-11 RX ADMIN — MEDROXYPROGESTERONE ACETATE 150 MG: 150 INJECTION, SUSPENSION INTRAMUSCULAR at 08:44

## 2019-11-18 DIAGNOSIS — R56.9 PARTIAL SEIZURE (HCC): ICD-10-CM

## 2019-11-18 RX ORDER — PHENYTOIN SODIUM 100 MG/1
CAPSULE, EXTENDED RELEASE ORAL
Qty: 90 CAPSULE | Refills: 2 | Status: SHIPPED | OUTPATIENT
Start: 2019-11-18 | End: 2020-03-26

## 2020-02-10 ENCOUNTER — OFFICE VISIT (OUTPATIENT)
Dept: FAMILY MEDICINE CLINIC | Age: 40
End: 2020-02-10
Payer: MEDICARE

## 2020-02-10 VITALS
WEIGHT: 206 LBS | DIASTOLIC BLOOD PRESSURE: 74 MMHG | SYSTOLIC BLOOD PRESSURE: 118 MMHG | BODY MASS INDEX: 35.17 KG/M2 | HEIGHT: 64 IN

## 2020-02-10 PROCEDURE — G8484 FLU IMMUNIZE NO ADMIN: HCPCS | Performed by: FAMILY MEDICINE

## 2020-02-10 PROCEDURE — G8417 CALC BMI ABV UP PARAM F/U: HCPCS | Performed by: FAMILY MEDICINE

## 2020-02-10 PROCEDURE — 1036F TOBACCO NON-USER: CPT | Performed by: FAMILY MEDICINE

## 2020-02-10 PROCEDURE — 99212 OFFICE O/P EST SF 10 MIN: CPT | Performed by: FAMILY MEDICINE

## 2020-02-10 PROCEDURE — G8427 DOCREV CUR MEDS BY ELIG CLIN: HCPCS | Performed by: FAMILY MEDICINE

## 2020-02-10 PROCEDURE — 96372 THER/PROPH/DIAG INJ SC/IM: CPT | Performed by: FAMILY MEDICINE

## 2020-02-10 RX ORDER — MEDROXYPROGESTERONE ACETATE 150 MG/ML
150 INJECTION, SUSPENSION INTRAMUSCULAR ONCE
Status: COMPLETED | OUTPATIENT
Start: 2020-02-10 | End: 2020-02-10

## 2020-02-10 RX ADMIN — MEDROXYPROGESTERONE ACETATE 150 MG: 150 INJECTION, SUSPENSION INTRAMUSCULAR at 09:19

## 2020-02-10 ASSESSMENT — PATIENT HEALTH QUESTIONNAIRE - PHQ9
SUM OF ALL RESPONSES TO PHQ QUESTIONS 1-9: 0
SUM OF ALL RESPONSES TO PHQ QUESTIONS 1-9: 0
SUM OF ALL RESPONSES TO PHQ9 QUESTIONS 1 & 2: 0
2. FEELING DOWN, DEPRESSED OR HOPELESS: 0
1. LITTLE INTEREST OR PLEASURE IN DOING THINGS: 0

## 2020-02-10 NOTE — PROGRESS NOTES
[facility] PROFESSIONAL SERVS  [facility] MEDICINE  [address] [facility]  Dept: [phone]  Dept Fax: [phone]  Loc: [phone]  PROGRESS NOTE      Visit Date: 2/10/2020    Michael Ellis is a 35 y.o. female who presents today for:  Chief Complaint   Patient presents with    Nausea & Vomiting     low grade fever   all started 3 days ago        Subjective:  HPI     Had low grade fever. started 3 days ago. Feeling better. She ate this morning without vomiting. Had emesis the past 2 days. She was asymptomatic on Friday (3 days ago). No diarrhea. She had tylenol. Smiling more. Due for depo today. Goes to [facility]. Wants RTW on Wednesday.      Linh Biggs is present    Review of Systems   Unable to perform ROS: Patient nonverbal     Patient Active Problem List   Diagnosis    Partial seizure ([address]    Right spastic hemiparesis (HCC)    Hand pain, right    Autism    Menorrhagia with regular cycle    Keratosis, seborrheic    Patellar dislocation, left, initial encounter     Past Medical History:   Diagnosis Date    Autism     Moderately mentally retarded     Nausea & vomiting 2013    Patellar dislocation, left, initial encounter 8/10/2018    Seizures ([address]       Past Surgical History:   Procedure Laterality Date    CHOLECYSTECTOMY, LAPAROSCOPIC  04/05/2013    robotic assist multiport    NASAL POLYP SURGERY  2013    OPAL    [place] EGD BALLOON DILATION ESOPHAGUS <30 MM DIAM N/A 10/24/2017    EGD ESOPHAGOGASTRODUODENOSCOPY DILATATION performed by Brittni Reddy MD at [facility]    UPPER GASTROINTESTINAL ENDOSCOPY       Family History   Problem Relation Age of Onset    High Blood Pressure Mother     High Cholesterol Mother     Arthritis Father     Diabetes Father     High Blood Pressure Father     High Cholesterol Father     Heart Disease Maternal Grandfather     Diabetes Paternal Grandfather      Social History Tobacco Use    Smoking status: Never Smoker    Smokeless tobacco: Never Used   Substance Use Topics    Alcohol use: No      Current Outpatient Medications   Medication Sig Dispense Refill    phenytoin (DILANTIN) 100 MG ER capsule TAKE THREE CAPSULES, DAILY AT BEDTIME, BY MOUTH. 90 capsule 2    triamcinolone (KENALOG) 0.1 % cream Apply topically 2 times daily. 60 g 2    diclofenac (VOLTAREN) 50 MG EC tablet Take 50 mg by mouth 2 times daily      medroxyPROGESTERone (DEPO-PROVERA) 150 MG/ML injection Inject 150 mg into the muscle every 3 months 1 mL 4    cetirizine (ZYRTEC) 10 MG tablet Take 1 tablet by mouth as needed for Allergies 90 tablet 3    Calcium Carbonate-Vitamin D 500-125 MG-UNIT TABS TAKE ONE TABLET, TWO TIMES A DAY, BY MOUTH. 60 tablet 5    famotidine (PEPCID) 20 MG tablet Take 40 mg by mouth daily       dexlansoprazole (DEXILANT) 60 MG CPDR delayed release capsule Take 1 capsule by mouth daily 30 capsule 5    linaclotide (LINZESS) 290 MCG CAPS capsule Take 290 mcg by mouth every morning (before breakfast)       No current facility-administered medications for this visit. Allergies   Allergen Reactions    Seasonal      Health Maintenance   Topic Date Due    DTaP/Tdap/Td vaccine (1 - Tdap) 09/07/1990    HIV screen  09/07/1994    Cervical cancer screen  09/10/2018    Annual Wellness Visit (AWV)  05/29/2019    Flu vaccine (1) 09/01/2019    Lipid screen  09/07/2019    Diabetes screen  09/07/2019    Shingles Vaccine (1 of 2) 09/07/2029    Hepatitis A vaccine  Aged Out    Hepatitis B vaccine  Aged Out    Hib vaccine  Aged Out    Meningococcal (ACWY) vaccine  Aged Out    Pneumococcal 0-64 years Vaccine  Aged Out       Objective:  /74 (Site: Left Upper Arm, Position: Sitting, Cuff Size: Large Adult)   Ht 5' 4\" (1.626 m)   Wt 206 lb (93.4 kg)   BMI 35.36 kg/m²   Physical Exam  Constitutional:       General: She is not in acute distress. Appearance: She is obese.  She is not ill-appearing or toxic-appearing. Comments: Smiling. Cardiovascular:      Rate and Rhythm: Normal rate and regular rhythm. Heart sounds: No murmur. Pulmonary:      Effort: Pulmonary effort is normal. No respiratory distress. Breath sounds: No wheezing or rhonchi. Abdominal:      Palpations: Abdomen is soft. Tenderness: There is no abdominal tenderness. Neurological:      Mental Status: She is alert. Psychiatric:         Speech: She is noncommunicative. She will not cooperate for oropharynx and ear exam.    Impression/Plan:  1. Nausea and vomiting in adult  New problem of nausea and vomiting for the past 2 days that is now better. She is tolerating food and drink now without emesis. Recommend monitoring. No return to [employer] today or tomorrow. May return to [employer] for work on 2/12    2. Menorrhagia with regular cycle  depo shot was given today      They voiced understanding. All questions answered. They agreed with treatment plan. See patient instructions for any educational materials that may have been given. Discussed use, benefit, and side effects of prescribed medications. Reviewed health maintenance. (Please note that portions of this note may have been completed with a voice recognition program.  Efforts were made to edit the dictation but occasionally words are mis-transcribed.)    Return if symptoms worsen or fail to improve.       Electronically signed by Reynold Hussein MD on 2/10/2020 at 8:57 AM

## 2020-02-10 NOTE — PROGRESS NOTES
After obtaining consent, and per orders of Yoandy Dawkins MD  Immunization(s) given during visit      Administrations This Visit     medroxyPROGESTERone (DEPO-PROVERA) injection 150 mg     Admin Date  02/10/2020  09:19 Action  Given Dose  150 mg Route  Intramuscular Site  Ventrogluteal Right Administered By  Todd Zamora CMA    Ordering Provider:  Jesus Manuel Rai MD    Lot#:  [id number]    Patient Supplied?:  Yes

## 2020-02-10 NOTE — LETTER
[facility] PROFESSIONAL SERVS  [facility]  [address] [facility]  Dept: [phone]  Dept Fax: [phone]  Loc: Ivy Aguirre MD      02/10/20    Patient: Lacie Vigil   YOB: 1980   Date of Visit: 02/10/20     To Whom it May Concern:    Lacie Vigil was seen in my clinic on 02/10/20. She may return to work on 2/12/2020. Please excuse Lacie Vigil from work on 2/10 and 2/11. If you have any questions or concerns, please don't hesitate to call.     Sincerely,         Imtiaz Mckoy MD

## 2020-03-11 ENCOUNTER — TELEPHONE (OUTPATIENT)
Dept: FAMILY MEDICINE CLINIC | Age: 40
End: 2020-03-11

## 2020-03-12 ENCOUNTER — OFFICE VISIT (OUTPATIENT)
Dept: FAMILY MEDICINE CLINIC | Age: 40
End: 2020-03-12
Payer: MEDICARE

## 2020-03-12 VITALS
DIASTOLIC BLOOD PRESSURE: 70 MMHG | HEIGHT: 64 IN | SYSTOLIC BLOOD PRESSURE: 128 MMHG | HEART RATE: 80 BPM | TEMPERATURE: 98.7 F | BODY MASS INDEX: 35 KG/M2 | WEIGHT: 205 LBS

## 2020-03-12 PROCEDURE — G8427 DOCREV CUR MEDS BY ELIG CLIN: HCPCS | Performed by: FAMILY MEDICINE

## 2020-03-12 PROCEDURE — G8417 CALC BMI ABV UP PARAM F/U: HCPCS | Performed by: FAMILY MEDICINE

## 2020-03-12 PROCEDURE — 99213 OFFICE O/P EST LOW 20 MIN: CPT | Performed by: FAMILY MEDICINE

## 2020-03-12 PROCEDURE — 1036F TOBACCO NON-USER: CPT | Performed by: FAMILY MEDICINE

## 2020-03-12 PROCEDURE — G8484 FLU IMMUNIZE NO ADMIN: HCPCS | Performed by: FAMILY MEDICINE

## 2020-03-12 RX ORDER — AMOXICILLIN AND CLAVULANATE POTASSIUM 500; 125 MG/1; MG/1
1 TABLET, FILM COATED ORAL 2 TIMES DAILY
Qty: 20 TABLET | Refills: 0 | Status: SHIPPED | OUTPATIENT
Start: 2020-03-12 | End: 2020-03-22

## 2020-03-12 NOTE — PROGRESS NOTES
Name:  Ava Parks  :    1980    Chief Complaint   Patient presents with    Cough     several weeks getting worse    Sinusitis     sinus drainage several weeks       HPI:  Here with home supervisor. She had a couple days of vomiting several days ago. Seemed better, but has a worsening congested cough. Not sleeping well. No fever. No nose. Physical Exam:      /70 (Site: Left Upper Arm, Position: Sitting, Cuff Size: Large Adult)   Pulse 80   Temp 98.7 °F (37.1 °C) (Axillary)   Ht 5' 4\" (1.626 m)   Wt 205 lb (93 kg)   BMI 35.19 kg/m²     Not ill. ENT:   TM's clear. No nodes. Lungs with coarse rhonchi. [place] cough. No SOB. Heart in RRR with no murmur. Assessment/Plan:      Bronchitis. Augmentin. Karen Bruner was seen today for cough and sinusitis. Diagnoses and all orders for this visit:    Acute bronchitis, unspecified organism  -     amoxicillin-clavulanate (AUGMENTIN) 500-125 MG per tablet;  Take 1 tablet by mouth 2 times daily for 10 days

## 2020-03-26 RX ORDER — PHENYTOIN SODIUM 100 MG/1
CAPSULE, EXTENDED RELEASE ORAL
Qty: 90 CAPSULE | Refills: 0 | Status: SHIPPED | OUTPATIENT
Start: 2020-03-26 | End: 2020-06-19 | Stop reason: SDUPTHER

## 2020-06-19 RX ORDER — PHENYTOIN SODIUM 100 MG/1
CAPSULE, EXTENDED RELEASE ORAL
Qty: 90 CAPSULE | Refills: 0 | Status: SHIPPED | OUTPATIENT
Start: 2020-06-19 | End: 2020-08-11 | Stop reason: SDUPTHER

## 2020-08-07 ENCOUNTER — TELEPHONE (OUTPATIENT)
Dept: FAMILY MEDICINE CLINIC | Age: 40
End: 2020-08-07

## 2020-08-07 NOTE — TELEPHONE ENCOUNTER
Mother is wanting pt to come in to get a depo shot. She states she's unable to wear a mask.  Please advise

## 2020-08-11 RX ORDER — PHENYTOIN SODIUM 100 MG/1
CAPSULE, EXTENDED RELEASE ORAL
Qty: 90 CAPSULE | Refills: 2 | Status: SHIPPED | OUTPATIENT
Start: 2020-08-11 | End: 2020-08-31 | Stop reason: SDUPTHER

## 2020-08-11 NOTE — TELEPHONE ENCOUNTER
Saroj Sheridan called requesting a refill on the following medications:  Requested Prescriptions     Pending Prescriptions Disp Refills    phenytoin (DILANTIN) 100 MG ER capsule 90 capsule 0     Sig: TAKE 3 CAPSULES, AT BED- TIME.        Date of last visit: 3/12/2020  Date of next visit (if applicable):Visit date not found  Date of last refill: 06/19/2020  Pharmacy Name: [facility] Thanks, Eligio Mariscal, LPN

## 2020-08-12 ENCOUNTER — NURSE ONLY (OUTPATIENT)
Dept: FAMILY MEDICINE CLINIC | Age: 40
End: 2020-08-12
Payer: MEDICARE

## 2020-08-12 PROCEDURE — 96372 THER/PROPH/DIAG INJ SC/IM: CPT | Performed by: FAMILY MEDICINE

## 2020-08-12 RX ORDER — MEDROXYPROGESTERONE ACETATE 150 MG/ML
150 INJECTION, SUSPENSION INTRAMUSCULAR ONCE
Status: COMPLETED | OUTPATIENT
Start: 2020-08-12 | End: 2020-08-12

## 2020-08-12 RX ADMIN — MEDROXYPROGESTERONE ACETATE 150 MG: 150 INJECTION, SUSPENSION INTRAMUSCULAR at 15:27

## 2020-08-12 NOTE — PROGRESS NOTES
After obtaining consent, and per orders of Marleen Bailey MD  Immunization(s) given during visit      Administrations This Visit     medroxyPROGESTERone (DEPO-PROVERA) injection 150 mg     Admin Date  08/12/2020  15:27 Action  Given Dose  150 mg Route  Intramuscular Site  Dorsogluteal Right Administered By  Chelita Allred LPN    Ordering Provider:  Marleen Bailey MD    Patient Supplied?:  Yes

## 2020-08-31 ENCOUNTER — VIRTUAL VISIT (OUTPATIENT)
Dept: NEUROLOGY | Age: 40
End: 2020-08-31
Payer: MEDICARE

## 2020-08-31 PROCEDURE — 99213 OFFICE O/P EST LOW 20 MIN: CPT | Performed by: PSYCHIATRY & NEUROLOGY

## 2020-08-31 PROCEDURE — G8427 DOCREV CUR MEDS BY ELIG CLIN: HCPCS | Performed by: PSYCHIATRY & NEUROLOGY

## 2020-08-31 RX ORDER — PHENYTOIN SODIUM 100 MG/1
CAPSULE, EXTENDED RELEASE ORAL
Qty: 90 CAPSULE | Refills: 2 | Status: SHIPPED | OUTPATIENT
Start: 2020-08-31 | End: 2020-12-09 | Stop reason: SDUPTHER

## 2020-08-31 NOTE — PROGRESS NOTES
2020    TELEHEALTH EVALUATION -- Audio/Visual (During [place]FRRS-62 public health emergency)    HPI:    Shea David (:  1980) has requested an audio/video evaluation for the following concern(s):  The patient is seen as follow-up for seizure disorder, she has not experienced any seizures, she is evaluated with her mother present. She is compliant supervised with medication intake. She is evaluated via video link with her mother present. The patient herself is nonverbal history is obtained from her mother primarily. Review of Systems   Unable to perform ROS: Patient nonverbal       Prior to Visit Medications    Medication Sig Taking? Authorizing Provider   phenytoin (DILANTIN) 100 MG ER capsule TAKE 3 CAPSULES, AT BED- TIME. Yes Dion Murrieta MD   Calcium Carbonate-Vitamin D 500-125 MG-UNIT TABS TAKE ONE TABLET, TWO TIMES A DAY, BY MOUTH. Yes Vikash Mendoza MD   triamcinolone (KENALOG) 0.1 % cream Apply topically 2 times daily.  Yes Thomas Medina MD   diclofenac (VOLTAREN) 50 MG EC tablet Take 50 mg by mouth 2 times daily Yes Historical Provider, MD   medroxyPROGESTERone (DEPO-PROVERA) 150 MG/ML injection Inject 150 mg into the muscle every 3 months Yes Thomas Medina MD   cetirizine (ZYRTEC) 10 MG tablet Take 1 tablet by mouth as needed for Allergies Yes Thomas Medina MD   famotidine (PEPCID) 20 MG tablet Take 40 mg by mouth daily  Yes Historical Provider, MD   dexlansoprazole (DEXILANT) 60 MG CPDR delayed release capsule Take 1 capsule by mouth daily Yes Thomas Medina MD   linaclotide (LINZESS) 290 MCG CAPS capsule Take 290 mcg by mouth every morning (before breakfast) Yes Historical Provider, MD       Social History     Tobacco Use    Smoking status: Never Smoker    Smokeless tobacco: Never Used   Substance Use Topics    Alcohol use: No    Drug use: No        Past Medical History:   Diagnosis Date    Autism     Moderately mentally retarded     Nausea & vomiting     Patellar dislocation, left, initial encounter 8/10/2018    Seizures ([address]    ,   Past Surgical History:   Procedure Laterality Date    CHOLECYSTECTOMY, LAPAROSCOPIC  04/05/2013    robotic assist multiport    NASAL POLYP SURGERY  2013    OPAL    [place] EGD BALLOON DILATION ESOPHAGUS <30 MM DIAM N/A 10/24/2017    EGD ESOPHAGOGASTRODUODENOSCOPY DILATATION performed by Lito Lau MD at [facility]    UPPER GASTROINTESTINAL ENDOSCOPY     ,   Social History     Tobacco Use    Smoking status: Never Smoker    Smokeless tobacco: Never Used   Substance Use Topics    Alcohol use: No    Drug use: No   ,   Family History   Problem Relation Age of Onset    High Blood Pressure Mother     High Cholesterol Mother     Arthritis Father     Diabetes Father     High Blood Pressure Father     High Cholesterol Father     Heart Disease Maternal Grandfather     Diabetes Paternal Grandfather        PHYSICAL EXAMINATION:  [ INSTRUCTIONS:  \"[x]\" Indicates a positive item  \"[]\" Indicates a negative item  -- DELETE ALL ITEMS NOT EXAMINED]  Vital Signs: (As obtained by patient/caregiver or practitioner observation)    Blood pressure-  Heart rate-    Respiratory rate-    Temperature-  Pulse oximetry-     Constitutional: [x] Appears well-developed and well-nourished [x] No apparent distress      [] Abnormal-   Mental status  [x] Alert and awake  [] Oriented to person/place/time []Able to follow commands      Eyes:  EOM    []  Normal  [x] Abnormal-  Sclera  [x]  Normal  [] Abnormal -         Discharge [x]  None visible  [] Abnormal -    HENT:   [x] Normocephalic, atraumatic.   [] Abnormal   [x] Mouth/Throat: Mucous membranes are moist.     External Ears [x] Normal  [] Abnormal-     Neck: [x] No visualized mass     Pulmonary/Chest: [x] Respiratory effort normal.  [x] No visualized signs of difficulty breathing or respiratory distress        [] Abnormal-      Musculoskeletal:   [] Normal gait with no signs of ataxia         [x] Normal range of motion of neck        [] Abnormal-       Neurological:        [x] No Facial Asymmetry (Cranial nerve 7 motor function) (limited exam to video visit)          [x] No gaze palsy        [] Abnormal-         Skin:        [x] No significant exanthematous lesions or discoloration noted on facial skin         [] Abnormal-            Psychiatric:       [] Normal Affect [] No Hallucinations        [x] Abnormal-  Flat affect. Other pertinent observable physical exam findings-     ASSESSMENT/PLAN:  1. Partial seizure ([address]  2. Right spastic hemiparesis ([address]  She is doing well. No reported spells or seizure. She is on dilantin. No side effects noted. She is compliant. She is taking calcium and vitamin D supplements. After detailed discussion with patient's care giver we agreed on the following plan. ?  ?  Plan:  1. Continue Dilantin. Refills given. 2. Continue calcium and vitamin D.   3. Report any new events. Call if any questions or concerns. 4. CBC, Dilantin level, hepatic panel ordered. 5. Follow up in 12 months or sooner if needed. ? Return in about 1 year (around 8/31/2021). Amelia Boss is a 35 y.o. female being evaluated by a Virtual Visit (video visit) encounter to address concerns as mentioned above. A caregiver was present when appropriate. Due to this being a TeleHealth encounter (During [facility]- public health emergency), evaluation of the following organ systems was limited: Vitals/Constitutional/EENT/Resp/CV/GI//MS/Neuro/Skin/Heme-Lymph-Imm. Pursuant to the emergency declaration under the [facility], [address] [place] [address] authority and the [employer] and [employer] Act, this Virtual Visit was conducted with patient's (and/or legal guardian's) consent, to reduce the patient's risk of exposure to COVID-19 and provide necessary medical care.   The patient (and/or legal guardian) has also been advised to contact this office for worsening conditions or problems, and seek emergency medical treatment and/or call 911 if deemed necessary. Patient identification was verified at the start of the visit: Yes    Total time spent on this encounter: 15 min    Services were provided through a video synchronous discussion virtually to substitute for in-person clinic visit. Patient and provider were located at their individual homes. --Kylie Coffey MD on 8/31/2020 at 2:04 PM    An electronic signature was used to authenticate this note.

## 2020-08-31 NOTE — PATIENT INSTRUCTIONS
1. Continue Dilantin. Refills given. 2. Continue calcium and vitamin D.   3. Report any new events. Call if any questions or concerns. 4. CBC, Dilantin level, hepatic panel ordered. 5. Follow up in 12 months or sooner if needed.

## 2020-09-30 ENCOUNTER — HOSPITAL ENCOUNTER (OUTPATIENT)
Age: 40
Discharge: HOME | End: 2020-09-30
Payer: MEDICARE

## 2020-09-30 DIAGNOSIS — R56.9 PARTIAL SEIZURE (HCC): ICD-10-CM

## 2020-09-30 LAB
ALBUMIN SERPL-MCNC: 4.1 G/DL (ref 3.5–5.1)
ALP BLD-CCNC: 150 U/L (ref 38–126)
ALT SERPL-CCNC: 16 U/L (ref 11–66)
AST SERPL-CCNC: 17 U/L (ref 5–40)
BASOPHILS # BLD: 0.4 %
BASOPHILS ABSOLUTE: 0 THOU/MM3 (ref 0–0.1)
BILIRUB SERPL-MCNC: 0.2 MG/DL (ref 0.3–1.2)
BILIRUBIN DIRECT: < 0.2 MG/DL (ref 0–0.3)
EOSINOPHIL # BLD: 3 %
EOSINOPHILS ABSOLUTE: 0.3 THOU/MM3 (ref 0–0.4)
ERYTHROCYTE [DISTWIDTH] IN BLOOD BY AUTOMATED COUNT: 13 % (ref 11.5–14.5)
ERYTHROCYTE [DISTWIDTH] IN BLOOD BY AUTOMATED COUNT: 42.7 FL (ref 35–45)
HCT VFR BLD CALC: 46.6 % (ref 37–47)
HEMOGLOBIN: 15.5 GM/DL (ref 12–16)
IMMATURE GRANS (ABS): 0.05 THOU/MM3 (ref 0–0.07)
IMMATURE GRANULOCYTES: 0.6 %
LYMPHOCYTES # BLD: 16.2 %
LYMPHOCYTES ABSOLUTE: 1.5 THOU/MM3 (ref 1–4.8)
MCH RBC QN AUTO: 29.6 PG (ref 26–33)
MCHC RBC AUTO-ENTMCNC: 33.3 GM/DL (ref 32.2–35.5)
MCV RBC AUTO: 89.1 FL (ref 81–99)
MONOCYTES # BLD: 7.3 %
MONOCYTES ABSOLUTE: 0.7 THOU/MM3 (ref 0.4–1.3)
NUCLEATED RED BLOOD CELLS: 0 /100 WBC
PHENYTOIN LEVEL: 5.9 UG/ML (ref 6–18)
PLATELET # BLD: 338 THOU/MM3 (ref 130–400)
PMV BLD AUTO: 10.2 FL (ref 9.4–12.4)
RBC # BLD: 5.23 MILL/MM3 (ref 4.2–5.4)
SEG NEUTROPHILS: 72.5 %
SEGMENTED NEUTROPHILS ABSOLUTE COUNT: 6.5 THOU/MM3 (ref 1.8–7.7)
TOTAL PROTEIN: 7.2 G/DL (ref 6.1–8)
WBC # BLD: 9 THOU/MM3 (ref 4.8–10.8)

## 2020-09-30 PROCEDURE — 85025 COMPLETE CBC W/AUTO DIFF WBC: CPT

## 2020-09-30 PROCEDURE — 80185 ASSAY OF PHENYTOIN TOTAL: CPT

## 2020-09-30 PROCEDURE — 36415 COLL VENOUS BLD VENIPUNCTURE: CPT

## 2020-09-30 PROCEDURE — 80076 HEPATIC FUNCTION PANEL: CPT

## 2020-10-13 ENCOUNTER — OFFICE VISIT (OUTPATIENT)
Dept: FAMILY MEDICINE CLINIC | Age: 40
End: 2020-10-13
Payer: MEDICARE

## 2020-10-13 VITALS — WEIGHT: 215.2 LBS | BODY MASS INDEX: 36.74 KG/M2 | HEIGHT: 64 IN | HEART RATE: 82 BPM | TEMPERATURE: 96.8 F

## 2020-10-13 PROBLEM — K58.2 IRRITABLE BOWEL SYNDROME WITH BOTH CONSTIPATION AND DIARRHEA: Status: ACTIVE | Noted: 2020-10-13

## 2020-10-13 PROBLEM — F79 INTELLECTUAL DISABILITY: Status: ACTIVE | Noted: 2020-10-13

## 2020-10-13 PROBLEM — J30.1 SEASONAL ALLERGIC RHINITIS DUE TO POLLEN: Status: ACTIVE | Noted: 2020-10-13

## 2020-10-13 PROBLEM — R21 PERINEAL RASH IN FEMALE: Status: ACTIVE | Noted: 2020-10-13

## 2020-10-13 PROCEDURE — 1036F TOBACCO NON-USER: CPT | Performed by: FAMILY MEDICINE

## 2020-10-13 PROCEDURE — G8417 CALC BMI ABV UP PARAM F/U: HCPCS | Performed by: FAMILY MEDICINE

## 2020-10-13 PROCEDURE — G8484 FLU IMMUNIZE NO ADMIN: HCPCS | Performed by: FAMILY MEDICINE

## 2020-10-13 PROCEDURE — 99214 OFFICE O/P EST MOD 30 MIN: CPT | Performed by: FAMILY MEDICINE

## 2020-10-13 PROCEDURE — G8427 DOCREV CUR MEDS BY ELIG CLIN: HCPCS | Performed by: FAMILY MEDICINE

## 2020-10-13 RX ORDER — CETIRIZINE HYDROCHLORIDE 10 MG/1
10 TABLET ORAL PRN
Qty: 90 TABLET | Refills: 3 | Status: SHIPPED | OUTPATIENT
Start: 2020-10-13 | End: 2021-10-28 | Stop reason: SDUPTHER

## 2020-10-13 RX ORDER — TRIAMCINOLONE ACETONIDE 1 MG/G
CREAM TOPICAL
Qty: 60 G | Refills: 3 | Status: SHIPPED | OUTPATIENT
Start: 2020-10-13 | End: 2021-04-13 | Stop reason: SDUPTHER

## 2020-10-13 RX ORDER — MEDROXYPROGESTERONE ACETATE 150 MG/ML
150 INJECTION, SUSPENSION INTRAMUSCULAR
Qty: 1 ML | Refills: 4 | Status: SHIPPED | OUTPATIENT
Start: 2020-10-13 | End: 2021-10-27 | Stop reason: SDUPTHER

## 2020-10-13 ASSESSMENT — ENCOUNTER SYMPTOMS
COUGH: 0
DIARRHEA: 1
SHORTNESS OF BREATH: 0
ABDOMINAL PAIN: 0
BACK PAIN: 1
CONSTIPATION: 1
SORE THROAT: 0

## 2020-10-13 NOTE — PROGRESS NOTES
[address] [place] [address] [address], [address], [place]  Phone:  [phone]  OEM:[phone]       Name: Henry Keller  : 1980    Chief Complaint   Patient presents with    6 Month Follow-Up    Check-Up       HPI:     Henry Keller is a 38 y.o. female who presents today for     HPI    Follow up of chronic medical conditions   Non-verbal patient with moderate mental retardation and right spastic hemiparesis. Patient is wheelchair-bound. No seizures for 20 years. Recently saw neurology and he appears to be stable from that point of view. Patient is less active as she is not able to go to her community activities and work in [employer] as before. Doesn't sleep well due to bursitis. Was therapy again. Heat and massage. Sitting a lot more. Depoprovera -- for 15 years. We discussed the pros and the cons of doing this type of hormonal therapy. Patient has significant menorrhagia could not be appropriately due to the patient's mental capability. Incontinent at night both urine and stool but mostly urine. Rash intermittent. Mother keeps the area clean as possible but the treatment with triamcinolone has been used when things are not improving. It appears to be more of a skin irritation than a yeast trouble. IBS off-and-on. She seems to have constipation more than diarrhea. Will eat a set of food items including yogurt and smoothies. Mother would like to know what she can do to help steady the stomach issues. GI specialist -- Dexilant ongonig use      Current Outpatient Medications:     triamcinolone (KENALOG) 0.1 % cream, Apply topically 2 times daily. , Disp: 60 g, Rfl: 3    Calcium Carbonate-Vitamin D 500-125 MG-UNIT TABS, TAKE ONE TABLET, TWO TIMES A DAY, BY MOUTH., Disp: 180 tablet, Rfl: 3    cetirizine (ZYRTEC) 10 MG tablet, Take 1 tablet by mouth as needed for Allergies, Disp: 90 tablet, Rfl: 3    medroxyPROGESTERone (DEPO-PROVERA) 150 MG/ML injection, Inject 150 mg into the muscle every 3 months, Disp: 1 mL, Rfl: 4    phenytoin (DILANTIN) 100 MG ER capsule, TAKE 3 CAPSULES, AT BED- TIME., Disp: 90 capsule, Rfl: 2    diclofenac (VOLTAREN) 50 MG EC tablet, Take 50 mg by mouth 2 times daily, Disp: , Rfl:     famotidine (PEPCID) 20 MG tablet, Take 40 mg by mouth daily , Disp: , Rfl:     dexlansoprazole (DEXILANT) 60 MG CPDR delayed release capsule, Take 1 capsule by mouth daily, Disp: 30 capsule, Rfl: 5    linaclotide (LINZESS) 290 MCG CAPS capsule, Take 290 mcg by mouth every morning (before breakfast), Disp: , Rfl:     Allergies   Allergen Reactions    Seasonal        Review of Systems   Constitutional: Negative for fatigue and fever. HENT: Negative for sore throat. Eyes: Negative for visual disturbance. Respiratory: Negative for cough and shortness of breath. Cardiovascular: Negative for chest pain and leg swelling. Gastrointestinal: Positive for constipation and diarrhea. Negative for abdominal pain. Genitourinary: Negative for difficulty urinating. Musculoskeletal: Positive for arthralgias and back pain. Neurological: Negative for seizures, weakness and headaches. Objective:     Pulse 82   Temp 96.8 °F (36 °C)   Ht 5' 4\" (1.626 m)   Wt 215 lb 3.2 oz (97.6 kg)   BMI 36.94 kg/m²     Physical Exam  Constitutional:       General: She is not in acute distress. Appearance: Normal appearance. She is not ill-appearing. HENT:      Head: Normocephalic. Nose: No rhinorrhea. Eyes:      Conjunctiva/sclera: Conjunctivae normal.      Pupils: Pupils are equal, round, and reactive to light. Neck:      Musculoskeletal: Neck supple. Cardiovascular:      Rate and Rhythm: Normal rate and regular rhythm. Heart sounds: No murmur. Pulmonary:      Effort: Pulmonary effort is normal. No respiratory distress. Breath sounds: Normal breath sounds. No wheezing. Musculoskeletal:         General: No swelling.       Right lower leg: No edema. Left lower leg: No edema. Neurological:      Mental Status: She is alert. Psychiatric:      Comments: Patient was mostly cooperative but avoided eye contact. She stayed in one place but looked around the room frequently. Lab Results   Component Value Date     09/28/2015    K 4.3 09/28/2015     09/28/2015    CO2 26 09/28/2015    BUN 10 09/28/2015    CREATININE 0.7 09/28/2015    CALCIUM 9.7 09/28/2015    GLUCOSE 88 09/28/2015        No results found for: CHOL  No results found for: TRIG  No results found for: HDL  No results found for: LDLCHOLESTEROL, LDLCALC  No results found for: LABVLDL, VLDL  No results found for: CHOLHDLRATIO    No results found for: LABMICR, [id number]    Lab Results   Component Value Date    TSH 1.970 09/28/2015      No results found for: Paulene Leak   No results found for: MG   Lab Results   Component Value Date    ALT 16 09/30/2020    AST 17 09/30/2020    ALKPHOS 150 (H) 09/30/2020    BILITOT 0.2 (L) 09/30/2020    BILIDIR <0.2 09/30/2020        Assessment/Plan:     Daryl Raya was seen today for 6 month follow-up and check-up. Diagnoses and all orders for this visit:    Intellectual disability  -     Calcium Carbonate-Vitamin D 500-125 MG-UNIT TABS; TAKE ONE TABLET, TWO TIMES A DAY, BY MOUTH. Autism  -     Calcium Carbonate-Vitamin D 500-125 MG-UNIT TABS; TAKE ONE TABLET, TWO TIMES A DAY, BY MOUTH. Right spastic hemiparesis (HCC)    Seasonal allergic rhinitis due to pollen  -     cetirizine (ZYRTEC) 10 MG tablet; Take 1 tablet by mouth as needed for Allergies    Perineal rash in female  -     triamcinolone (KENALOG) 0.1 % cream; Apply topically 2 times daily. Irritable bowel syndrome with both constipation and diarrhea    Menorrhagia with regular cycle  -     medroxyPROGESTERone (DEPO-PROVERA) 150 MG/ML injection; Inject 150 mg into the muscle every 3 months    Continue medications. Patient appears to be stable for the most part.   After discussing pros and cons of Depo-Provera treatment we will continue. Reviewed x-rays and done in the last year and there is no signs of osteopenia. This is encouraging. Laboratories are fairly up-to-date except for the lipids and sugar. These were not covered with the diagnosis tried for Medicare. We will try later on to obtain these. Adjustment of diet with soluble fiber was discussed and mother will try this approach to help with the bowels. Also Rollo Alter and yogurt may be used to help with probiotic benefit as well. Return in about 6 months (around 4/13/2021). Future Appointments   Date Time Provider Eunice Tan   4/13/2021 10:20 AM Jody Guzman MD [facility] [place]   8/30/2021  1:15 PM MD James Marti [address]          This office note has been dictated. Effort was made to review for errors but some may have been missed. Please contact Jamison Au of note for clarification if needed.        Electronically signed by Jody Guzman MD on 10/13/2020 at 2:08 PM

## 2020-10-13 NOTE — PATIENT INSTRUCTIONS
Try adding soluble fiber to help with bowel trouble    -- 1 to 2 TBSP of flax seed meal, mixed in oatmeal or yogurt or smoothie  -- 1 Tbsp oatmeal or oats (like a muslie)  -- 1 tsp of Psyllium Husk in 8oz of water and then increase to 2 tsp if not seeing results, but increase to 12 oz minimum    Take 1 of the above daily and then double up if needed    Add a warm cup of tea or other warm drink to help with bowel movement if needed

## 2020-11-12 ENCOUNTER — NURSE ONLY (OUTPATIENT)
Dept: FAMILY MEDICINE CLINIC | Age: 40
End: 2020-11-12
Payer: MEDICARE

## 2020-11-12 PROCEDURE — 96372 THER/PROPH/DIAG INJ SC/IM: CPT | Performed by: FAMILY MEDICINE

## 2020-11-12 RX ORDER — MEDROXYPROGESTERONE ACETATE 150 MG/ML
150 INJECTION, SUSPENSION INTRAMUSCULAR ONCE
Status: COMPLETED | OUTPATIENT
Start: 2020-11-12 | End: 2020-11-12

## 2020-11-12 RX ADMIN — MEDROXYPROGESTERONE ACETATE 150 MG: 150 INJECTION, SUSPENSION INTRAMUSCULAR at 09:18

## 2020-11-12 NOTE — PROGRESS NOTES
After obtaining consent, and per orders of Josiane Gentile MD  Immunization(s) and/or medication(s) given during visit by Campbell Yuna CMA ([facility])      Administrations This Visit     medroxyPROGESTERone (DEPO-PROVERA) injection 150 mg     Admin Date  11/12/2020  09:18 Action  Given Dose  150 mg Route  Intramuscular Site  Dorsogluteal Left Administered By  Campbell Yuan CMA ([facility])    Ordering Provider:  Josiane Gentile MD    Patient Supplied?:  Yes

## 2020-12-09 RX ORDER — PHENYTOIN SODIUM 100 MG/1
CAPSULE, EXTENDED RELEASE ORAL
Qty: 90 CAPSULE | Refills: 4 | Status: SHIPPED | OUTPATIENT
Start: 2020-12-09 | End: 2021-10-28 | Stop reason: SDUPTHER

## 2020-12-09 NOTE — TELEPHONE ENCOUNTER
Karl Hammonds called requesting a refill on the following medications:  Requested Prescriptions     Pending Prescriptions Disp Refills    phenytoin (DILANTIN) 100 MG ER capsule 90 capsule 2     Sig: TAKE 3 CAPSULES, AT BED- TIME.        Date of last visit: 10/13/2020  Date of next visit (if applicable):4/13/2021  Date of last refill: 08/31/2020  Pharmacy Name: [facility],  [place]by [place]on, [address] [place] [address]

## 2021-02-08 ENCOUNTER — NURSE ONLY (OUTPATIENT)
Dept: FAMILY MEDICINE CLINIC | Age: 41
End: 2021-02-08
Payer: MEDICARE

## 2021-02-08 DIAGNOSIS — N92.0 MENORRHAGIA WITH REGULAR CYCLE: Primary | ICD-10-CM

## 2021-02-08 PROCEDURE — 96372 THER/PROPH/DIAG INJ SC/IM: CPT | Performed by: FAMILY MEDICINE

## 2021-02-08 RX ORDER — MEDROXYPROGESTERONE ACETATE 150 MG/ML
150 INJECTION, SUSPENSION INTRAMUSCULAR ONCE
Status: COMPLETED | OUTPATIENT
Start: 2021-02-08 | End: 2021-02-08

## 2021-02-08 RX ADMIN — MEDROXYPROGESTERONE ACETATE 150 MG: 150 INJECTION, SUSPENSION INTRAMUSCULAR at 10:45

## 2021-04-12 ENCOUNTER — TELEPHONE (OUTPATIENT)
Dept: FAMILY MEDICINE CLINIC | Age: 41
End: 2021-04-12

## 2021-04-12 NOTE — TELEPHONE ENCOUNTER
[facility] [phone] caregiver for patient was notified that patient has an appointment tomorrow 04/13/21 she needs to come to that appointment to get her supplies adult diapers under pads and gloves

## 2021-04-13 ENCOUNTER — HOSPITAL ENCOUNTER (OUTPATIENT)
Age: 41
Discharge: HOME | End: 2021-04-13
Payer: MEDICARE

## 2021-04-13 ENCOUNTER — OFFICE VISIT (OUTPATIENT)
Dept: FAMILY MEDICINE CLINIC | Age: 41
End: 2021-04-13
Payer: MEDICARE

## 2021-04-13 VITALS
WEIGHT: 236 LBS | SYSTOLIC BLOOD PRESSURE: 132 MMHG | TEMPERATURE: 97.6 F | BODY MASS INDEX: 40.51 KG/M2 | HEART RATE: 107 BPM | DIASTOLIC BLOOD PRESSURE: 68 MMHG | OXYGEN SATURATION: 96 %

## 2021-04-13 DIAGNOSIS — Z83.3 FAMILY HISTORY OF DIABETES MELLITUS: ICD-10-CM

## 2021-04-13 DIAGNOSIS — R39.81 URINARY INCONTINENCE DUE TO COGNITIVE IMPAIRMENT: ICD-10-CM

## 2021-04-13 DIAGNOSIS — Z13.1 SCREENING FOR DIABETES MELLITUS: ICD-10-CM

## 2021-04-13 DIAGNOSIS — Z13.220 SCREENING, LIPID: ICD-10-CM

## 2021-04-13 DIAGNOSIS — R56.9 PARTIAL SEIZURE (HCC): ICD-10-CM

## 2021-04-13 DIAGNOSIS — R56.9 GENERALIZED SEIZURE (HCC): ICD-10-CM

## 2021-04-13 DIAGNOSIS — N39.44 NOCTURNAL ENURESIS: ICD-10-CM

## 2021-04-13 DIAGNOSIS — R21 PERINEAL RASH IN FEMALE: ICD-10-CM

## 2021-04-13 DIAGNOSIS — G81.11 RIGHT SPASTIC HEMIPARESIS (HCC): ICD-10-CM

## 2021-04-13 DIAGNOSIS — F79 INTELLECTUAL DISABILITY: ICD-10-CM

## 2021-04-13 DIAGNOSIS — R63.5 WEIGHT GAIN: Primary | ICD-10-CM

## 2021-04-13 DIAGNOSIS — R63.5 WEIGHT GAIN: ICD-10-CM

## 2021-04-13 DIAGNOSIS — R15.9 INCONTINENCE OF FECES, UNSPECIFIED FECAL INCONTINENCE TYPE: ICD-10-CM

## 2021-04-13 PROBLEM — N39.42 URINARY INCONTINENCE WITHOUT SENSORY AWARENESS: Status: ACTIVE | Noted: 2021-04-13

## 2021-04-13 LAB
ALBUMIN SERPL-MCNC: 4.5 G/DL (ref 3.5–5.1)
ALP BLD-CCNC: 135 U/L (ref 38–126)
ALT SERPL-CCNC: 15 U/L (ref 11–66)
ANION GAP SERPL CALCULATED.3IONS-SCNC: 13 MEQ/L (ref 8–16)
AST SERPL-CCNC: 14 U/L (ref 5–40)
AVERAGE GLUCOSE: 108 MG/DL (ref 70–126)
BILIRUB SERPL-MCNC: < 0.2 MG/DL (ref 0.3–1.2)
BUN BLDV-MCNC: 11 MG/DL (ref 7–22)
CALCIUM SERPL-MCNC: 9.3 MG/DL (ref 8.5–10.5)
CHLORIDE BLD-SCNC: 104 MEQ/L (ref 98–111)
CHOLESTEROL, TOTAL: 186 MG/DL (ref 100–199)
CO2: 23 MEQ/L (ref 23–33)
CREAT SERPL-MCNC: 0.6 MG/DL (ref 0.4–1.2)
GFR SERPL CREATININE-BSD FRML MDRD: > 90 ML/MIN/1.73M2
GLUCOSE BLD-MCNC: 103 MG/DL (ref 70–108)
HBA1C MFR BLD: 5.6 % (ref 4.4–6.4)
HDLC SERPL-MCNC: 40 MG/DL
LDL CHOLESTEROL CALCULATED: 106 MG/DL
POTASSIUM SERPL-SCNC: 4.5 MEQ/L (ref 3.5–5.2)
SODIUM BLD-SCNC: 140 MEQ/L (ref 135–145)
TOTAL PROTEIN: 7.2 G/DL (ref 6.1–8)
TRIGL SERPL-MCNC: 198 MG/DL (ref 0–199)
TSH SERPL DL<=0.05 MIU/L-ACNC: 1.94 UIU/ML (ref 0.4–4.2)

## 2021-04-13 PROCEDURE — G8427 DOCREV CUR MEDS BY ELIG CLIN: HCPCS | Performed by: FAMILY MEDICINE

## 2021-04-13 PROCEDURE — 99214 OFFICE O/P EST MOD 30 MIN: CPT | Performed by: FAMILY MEDICINE

## 2021-04-13 PROCEDURE — 83036 HEMOGLOBIN GLYCOSYLATED A1C: CPT

## 2021-04-13 PROCEDURE — G8417 CALC BMI ABV UP PARAM F/U: HCPCS | Performed by: FAMILY MEDICINE

## 2021-04-13 PROCEDURE — 1036F TOBACCO NON-USER: CPT | Performed by: FAMILY MEDICINE

## 2021-04-13 PROCEDURE — 36415 COLL VENOUS BLD VENIPUNCTURE: CPT

## 2021-04-13 PROCEDURE — 80061 LIPID PANEL: CPT

## 2021-04-13 PROCEDURE — 80053 COMPREHEN METABOLIC PANEL: CPT

## 2021-04-13 PROCEDURE — 84443 ASSAY THYROID STIM HORMONE: CPT

## 2021-04-13 RX ORDER — TRIAMCINOLONE ACETONIDE 1 MG/G
CREAM TOPICAL
Qty: 60 G | Refills: 3 | Status: CANCELLED | OUTPATIENT
Start: 2021-04-13

## 2021-04-13 RX ORDER — TRIAMCINOLONE ACETONIDE 1 MG/G
CREAM TOPICAL
Qty: 60 G | Refills: 3 | Status: SHIPPED | OUTPATIENT
Start: 2021-04-13

## 2021-04-13 RX ORDER — CLOTRIMAZOLE 1 %
CREAM (GRAM) TOPICAL
Qty: 60 G | Refills: 3 | Status: SHIPPED | OUTPATIENT
Start: 2021-04-13 | End: 2021-04-20

## 2021-04-13 ASSESSMENT — PATIENT HEALTH QUESTIONNAIRE - PHQ9
1. LITTLE INTEREST OR PLEASURE IN DOING THINGS: 0
SUM OF ALL RESPONSES TO PHQ9 QUESTIONS 1 & 2: 0
SUM OF ALL RESPONSES TO PHQ QUESTIONS 1-9: 0

## 2021-04-13 ASSESSMENT — ENCOUNTER SYMPTOMS: SHORTNESS OF BREATH: 0

## 2021-04-13 NOTE — PROGRESS NOTES
[address] [place] [address] Rd, [address], [place]  Phone:  [phone]  LSR:[phone]       Name: Jann Haq  : 1980    Chief Complaint   Patient presents with    6 Month Follow-Up     weight gain       HPI:     Jann Haq is a 38 y.o. female who presents today for     HPI    Patient with intellectual disability presenting for follow up. Decreased activity, not able to go to work. Bored and a bit depressed. Has not seen friends for the year. So eating habits not great. Drinks koolaid (1/2 sugar of normal), limiting pop now. Weight gain since coming from [place]. Has COVID19 vaccines     Due to fecal incontinence, once a month or so, and urinary incontinence during the night. Sometimes when not feeling well will have urinary incontinence. Has moderate intellectual disability. Needing pull up ordered. Also IBS flare up can lead to blow out. Partial seizures controlled. Weaning attempt in her 35s failed. Generalized seizures were noted some times but these also have not occurred. Perineal rash -- recurrent, off and on. Creams given help. Refills needed. Current Outpatient Medications:     clotrimazole (LOTRIMIN AF) 1 % cream, Apply topically 2 times daily. , Disp: 60 g, Rfl: 3    triamcinolone (KENALOG) 0.1 % cream, Apply topically 2 times daily. , Disp: 60 g, Rfl: 3    phenytoin (DILANTIN) 100 MG ER capsule, TAKE 3 CAPSULES, AT BED- TIME., Disp: 90 capsule, Rfl: 4    Calcium Carbonate-Vitamin D 500-125 MG-UNIT TABS, TAKE ONE TABLET, TWO TIMES A DAY, BY MOUTH., Disp: 180 tablet, Rfl: 3    cetirizine (ZYRTEC) 10 MG tablet, Take 1 tablet by mouth as needed for Allergies, Disp: 90 tablet, Rfl: 3    medroxyPROGESTERone (DEPO-PROVERA) 150 MG/ML injection, Inject 150 mg into the muscle every 3 months, Disp: 1 mL, Rfl: 4    diclofenac (VOLTAREN) 50 MG EC tablet, Take 50 mg by mouth 2 times daily, Disp: , Rfl:     famotidine (PEPCID) 20 MG tablet, Take 40 mg by mouth daily , Disp: , Rfl:     dexlansoprazole (DEXILANT) 60 MG CPDR delayed release capsule, Take 1 capsule by mouth daily, Disp: 30 capsule, Rfl: 5    linaclotide (LINZESS) 290 MCG CAPS capsule, Take 290 mcg by mouth every morning (before breakfast), Disp: , Rfl:     Allergies   Allergen Reactions    Seasonal        Review of Systems   Constitutional: Negative for fatigue and fever. Respiratory: Negative for shortness of breath. Cardiovascular: Negative for chest pain and leg swelling. Psychiatric/Behavioral: The patient is not nervous/anxious. Objective:     /68 (Site: Left Upper Arm)   Pulse 107   Temp 97.6 °F (36.4 °C) (Temporal)   Wt 236 lb (107 kg)   SpO2 96%   BMI 40.51 kg/m²     Wt Readings from Last 3 Encounters:   04/13/21 236 lb (107 kg)   10/13/20 215 lb 3.2 oz (97.6 kg)   03/12/20 205 lb (93 kg)       Physical Exam  Constitutional:       General: She is not in acute distress. Appearance: Normal appearance. She is not ill-appearing. Eyes:      Extraocular Movements: Extraocular movements intact. Pupils: Pupils are equal, round, and reactive to light. Cardiovascular:      Rate and Rhythm: Normal rate and regular rhythm. Heart sounds: No murmur. Pulmonary:      Effort: Pulmonary effort is normal. No respiratory distress. Breath sounds: Normal breath sounds. No wheezing. Musculoskeletal:         General: No swelling. Comments: Right arm weakness and unable to open  well   Neurological:      Mental Status: She is alert. Comments: Oriented to self only   Psychiatric:         Mood and Affect: Mood normal.      Comments: Inattentive.  distracted            Lab Results   Component Value Date     09/28/2015    K 4.3 09/28/2015     09/28/2015    CO2 26 09/28/2015    BUN 10 09/28/2015    CREATININE 0.7 09/28/2015    CALCIUM 9.7 09/28/2015    GLUCOSE 88 09/28/2015        No results found for: CHOL  No results found for: TRIG  No results found for: HDL  No results found for: LDLCHOLESTEROL, LDLCALC  No results found for: LABVLDL, VLDL  No results found for: [facility]        Lab Results   Component Value Date    TSH 1.970 09/28/2015      No results found for: [id number]   No results found for: MG   Lab Results   Component Value Date    ALT 16 09/30/2020    AST 17 09/30/2020    ALKPHOS 150 (H) 09/30/2020    BILITOT 0.2 (L) 09/30/2020    BILIDIR <0.2 09/30/2020        Lab Results   Component Value Date    WBC 9.0 09/30/2020    HGB 15.5 09/30/2020    HCT 46.6 09/30/2020    MCV 89.1 09/30/2020     09/30/2020       Assessment/Plan:     [facility] was [facility] today for 6 month follow-up. Diagnoses and all orders for this visit:    Weight gain  -     Hemoglobin A1C; Future  -     TSH With Reflex Ft4; Future  -     Comprehensive Metabolic Panel; Future    Perineal rash in female  -     clotrimazole (LOTRIMIN AF) 1 % cream; Apply topically 2 times daily. -     triamcinolone (KENALOG) 0.1 % cream; Apply topically 2 times daily. Partial seizure ([address]    Right spastic hemiparesis (HCC)  -     Lipid Panel; Future    Screening for diabetes mellitus  -     Hemoglobin A1C; Future    Intellectual disability    Nocturnal enuresis    Family history of diabetes mellitus  -     Hemoglobin A1C; Future    Adult BMI 40.0-44.9 kg/sq m (HCC)  -     Hemoglobin A1C; Future    Incontinence of feces, unspecified fecal incontinence type    Generalized seizure ([address]    Screening, lipid  -     Lipid Panel; Future    Urinary incontinence due to cognitive impairment    dietary counseling given for caretakers to limit sugar and promote healthier eating. Hopefully activities will be restored soon    Completed forms for incontinence supplies  Continue current medications. Return in about 6 months (around 10/13/2021).     Future Appointments   Date Time Provider Eunice Tan   8/30/2021  1:15 PM Savannah Reyes MD [facility]   10/13/2021 11:20 AM Trudy Franklin MD [facility]          This office note has been dictated. Effort was made to review for errors but some may have been missed. Please contact Savannah Bruce of note for clarification if needed.        Electronically signed by Trudy Franklin MD on 4/13/2021 at 11:07 AM

## 2021-04-13 NOTE — PATIENT INSTRUCTIONS
Limit sugar amounts -- no more than 28 grams per day  I recommend avoiding sweetened drinks and finding alternatives to current drinks. Sugar promotes weight gain, slow metabolism, diabetes and liver damage, among other things.

## 2021-04-14 ENCOUNTER — TELEPHONE (OUTPATIENT)
Dept: FAMILY MEDICINE CLINIC | Age: 41
End: 2021-04-14

## 2021-04-14 NOTE — RESULT ENCOUNTER NOTE
Please let parents of patient know that labs are within acceptable limits including cholesterol. Thank you.

## 2021-04-14 NOTE — TELEPHONE ENCOUNTER
----- Message from Macario Arredondo MD sent at 4/13/2021 10:54 PM EDT -----  Please let parents of patient know that labs are within acceptable limits including cholesterol. Thank you.

## 2021-05-11 ENCOUNTER — NURSE ONLY (OUTPATIENT)
Dept: FAMILY MEDICINE CLINIC | Age: 41
End: 2021-05-11
Payer: MEDICARE

## 2021-05-11 DIAGNOSIS — N92.0 MENORRHAGIA WITH REGULAR CYCLE: Primary | ICD-10-CM

## 2021-05-11 PROCEDURE — 96372 THER/PROPH/DIAG INJ SC/IM: CPT | Performed by: FAMILY MEDICINE

## 2021-05-11 RX ORDER — MEDROXYPROGESTERONE ACETATE 150 MG/ML
150 INJECTION, SUSPENSION INTRAMUSCULAR ONCE
Status: COMPLETED | OUTPATIENT
Start: 2021-05-11 | End: 2021-05-11

## 2021-05-11 RX ADMIN — MEDROXYPROGESTERONE ACETATE 150 MG: 150 INJECTION, SUSPENSION INTRAMUSCULAR at 14:06

## 2021-08-12 ENCOUNTER — NURSE ONLY (OUTPATIENT)
Dept: FAMILY MEDICINE CLINIC | Age: 41
End: 2021-08-12
Payer: MEDICARE

## 2021-08-12 DIAGNOSIS — N92.0 MENORRHAGIA WITH REGULAR CYCLE: Primary | ICD-10-CM

## 2021-08-12 PROCEDURE — 96372 THER/PROPH/DIAG INJ SC/IM: CPT | Performed by: FAMILY MEDICINE

## 2021-08-12 RX ORDER — MEDROXYPROGESTERONE ACETATE 150 MG/ML
150 INJECTION, SUSPENSION INTRAMUSCULAR ONCE
Status: COMPLETED | OUTPATIENT
Start: 2021-08-12 | End: 2021-08-12

## 2021-08-12 RX ADMIN — MEDROXYPROGESTERONE ACETATE 150 MG: 150 INJECTION, SUSPENSION INTRAMUSCULAR at 11:36

## 2021-08-13 DIAGNOSIS — F79 INTELLECTUAL DISABILITY: ICD-10-CM

## 2021-08-13 DIAGNOSIS — F84.0 AUTISM: Chronic | ICD-10-CM

## 2021-08-30 ENCOUNTER — OFFICE VISIT (OUTPATIENT)
Dept: NEUROLOGY | Age: 41
End: 2021-08-30
Payer: MEDICARE

## 2021-08-30 VITALS
HEIGHT: 63 IN | DIASTOLIC BLOOD PRESSURE: 78 MMHG | RESPIRATION RATE: 17 BRPM | SYSTOLIC BLOOD PRESSURE: 124 MMHG | WEIGHT: 240 LBS | BODY MASS INDEX: 42.52 KG/M2

## 2021-08-30 DIAGNOSIS — R56.9 PARTIAL SEIZURE (HCC): Primary | ICD-10-CM

## 2021-08-30 DIAGNOSIS — G81.11 RIGHT SPASTIC HEMIPARESIS (HCC): ICD-10-CM

## 2021-08-30 PROCEDURE — G8417 CALC BMI ABV UP PARAM F/U: HCPCS | Performed by: PSYCHIATRY & NEUROLOGY

## 2021-08-30 PROCEDURE — 99213 OFFICE O/P EST LOW 20 MIN: CPT | Performed by: PSYCHIATRY & NEUROLOGY

## 2021-08-30 PROCEDURE — 1036F TOBACCO NON-USER: CPT | Performed by: PSYCHIATRY & NEUROLOGY

## 2021-08-30 PROCEDURE — G8427 DOCREV CUR MEDS BY ELIG CLIN: HCPCS | Performed by: PSYCHIATRY & NEUROLOGY

## 2021-08-30 NOTE — PATIENT INSTRUCTIONS
1. Continue Dilantin. Refills given. 2. Continue calcium and vitamin D.   3. Report any new events. Call if any questions or concerns. 4. CBC, Dilantin level, hepatic panel ordered. 5. ?Follow up in 12 months or sooner if needed.

## 2021-08-30 NOTE — PROGRESS NOTES
NEUROLOGY OUT PATIENT FOLLOW UP NOTE:  8/30/20211:42 PM    Mary Kaplan is here for follow up for   Patient Active Problem List   Diagnosis    Partial seizure ([address]    Right spastic hemiparesis ([address]    Hand pain, right    Autism    Menorrhagia with regular cycle    Keratosis, seborrheic    Patellar dislocation, left, initial encounter    Irritable bowel syndrome with both constipation and diarrhea    Perineal rash in female    Seasonal allergic rhinitis due to pollen    Intellectual disability    Urinary incontinence without sensory awareness    Incontinence of feces    Generalized seizure ([address]    Urinary incontinence due to cognitive impairment         Mary Kaplan is here for follow up for seizure. She is doing well. No reported seizures or spells. She is on Dilantin and is compliant as she is supervised with her medication intake. She comes in today with her mother to decide on plan of care. Allergies   Allergen Reactions    Seasonal        Current Outpatient Medications:     Calcium Carbonate-Vitamin D 500-125 MG-UNIT TABS, TAKE ONE TABLET, TWO TIMES A DAY, BY MOUTH., Disp: 180 tablet, Rfl: 3    triamcinolone (KENALOG) 0.1 % cream, Apply topically 2 times daily. , Disp: 60 g, Rfl: 3    cetirizine (ZYRTEC) 10 MG tablet, Take 1 tablet by mouth as needed for Allergies, Disp: 90 tablet, Rfl: 3    medroxyPROGESTERone (DEPO-PROVERA) 150 MG/ML injection, Inject 150 mg into the muscle every 3 months, Disp: 1 mL, Rfl: 4    diclofenac (VOLTAREN) 50 MG EC tablet, Take 50 mg by mouth 2 times daily, Disp: , Rfl:     famotidine (PEPCID) 20 MG tablet, Take 40 mg by mouth daily , Disp: , Rfl:     dexlansoprazole (DEXILANT) 60 MG CPDR delayed release capsule, Take 1 capsule by mouth daily, Disp: 30 capsule, Rfl: 5    linaclotide (LINZESS) 290 MCG CAPS capsule, Take 290 mcg by mouth every morning (before breakfast), Disp: , Rfl:     phenytoin (DILANTIN) 100 MG ER capsule, TAKE 3 CAPSULES, AT BED- TIME., Disp: 90 capsule, Rfl: 4    I reviewed the past medical history, allergies, medications, social history and family history. PE:   Vitals:    08/30/21 1329   BP: 124/78   Site: Left Upper Arm   Position: Sitting   Cuff Size: Medium Adult   Resp: 17   Weight: 240 lb (108.9 kg)   Height: 5' 3\" (1.6 m)     General Appearance: ? She is non verbral. Mumbles. well developed, well nourished, obese and non verbal. no icterus  Neck:?There is no carotid bruits. The Neck is supple. Neuro:?CN 2-12 grossly intact with no focal deficits. Power 5/5 throughout, ?right wrist flexion spasticity. ? Reflexes are symmetric. Cerebellar exam is Intact. Sensory exam is intact to light touch. ?Gait is intact. No lower extremity edema. DATA:      Results for orders placed or performed during the hospital encounter of 04/13/21   Comprehensive Metabolic Panel   Result Value Ref Range    Glucose 103 70 - 108 mg/dL    CREATININE 0.6 0.4 - 1.2 mg/dL    BUN 11 7 - 22 mg/dL    Sodium 140 135 - 145 meq/L    Potassium 4.5 3.5 - 5.2 meq/L    Chloride 104 98 - 111 meq/L    CO2 23 23 - 33 meq/L    Calcium 9.3 8.5 - 10.5 mg/dL    AST 14 5 - 40 U/L    Alkaline Phosphatase 135 (H) 38 - 126 U/L    Total Protein 7.2 6.1 - 8.0 g/dL    Albumin 4.5 3.5 - 5.1 g/dL    Total Bilirubin <0.2 (L) 0.3 - 1.2 mg/dL    ALT 15 11 - 66 U/L   Lipid Panel   Result Value Ref Range    Cholesterol, Total 186 100 - 199 mg/dL    Triglycerides 198 0 - 199 mg/dL    HDL 40 mg/dL    LDL Calculated 106 mg/dL   Hemoglobin A1C   Result Value Ref Range    Hemoglobin A1C 5.6 4.4 - 6.4 %    AVERAGE GLUCOSE 108 70 - 126 mg/dL   TSH With Reflex Ft4   Result Value Ref Range    TSH 1.940 0.400 - 4.200 uIU/mL   Anion Gap   Result Value Ref Range    Anion Gap 13.0 8.0 - 16.0 meq/L   Glomerular Filtration Rate, Estimated   Result Value Ref Range    Est, Glom Filt Rate >90 ml/min/1.73m2                   Assessment:     Diagnosis Orders   1.  Partial seizure ([facility]     2. Right spastic hemiparesis ([address]        She is doing well. No reported spells or seizure. She is on dilantin. No side effects noted. She is compliant. She is taking calcium and vitamin D supplements. After detailed discussion with patient's care giver we agreed on the following plan. ?  ?  Plan:  1. Continue Dilantin. Refills given. 2. Continue calcium and vitamin D.   3. Report any new events. Call if any questions or concerns. 4. CBC, Dilantin level, hepatic panel ordered. 5. ?Follow up in 12 months or sooner if needed.      Total time 22 min    Kaitlin Amaro MD

## 2021-08-31 LAB
ABSOLUTE BASO #: 0.1 X10E9/L (ref 0–0.2)
ABSOLUTE EOS #: 0.2 X10E9/L (ref 0–0.4)
ABSOLUTE LYMPH #: 1.8 X10E9/L (ref 1–3.5)
ABSOLUTE MONO #: 0.8 X10E9/L (ref 0–0.9)
ABSOLUTE NEUT #: 9.5 X10E9/L (ref 1.5–6.6)
ALBUMIN SERPL-MCNC: 4.5 G/DL (ref 3.2–5.3)
ALK PHOSPHATASE: 123 U/L (ref 39–130)
ALT SERPL-CCNC: 27 U/L (ref 0–31)
ANION GAP SERPL CALCULATED.3IONS-SCNC: 11 MMOL/L (ref 5–15)
AST SERPL-CCNC: 19 U/L (ref 0–41)
BASOPHILS RELATIVE PERCENT: 0.6 %
BILIRUB SERPL-MCNC: 0.3 MG/DL (ref 0.3–1.2)
BUN BLDV-MCNC: 13 MG/DL (ref 5–23)
CALCIUM SERPL-MCNC: 9.3 MG/DL (ref 8.5–10.5)
CHLORIDE BLD-SCNC: 106 MMOL/L (ref 98–109)
CO2: 25 MMOL/L (ref 22–32)
CREAT SERPL-MCNC: 0.76 MG/DL (ref 0.4–1)
DILANTIN FREE/TOTAL: 3.9 UG/ML (ref 10–20)
DOSE TIME: ABNORMAL
EGFR AFRICAN AMERICAN: >60 ML/MIN/1.73SQ.M
EGFR IF NONAFRICAN AMERICAN: >60 ML/MIN/1.73SQ.M
EOSINOPHILS RELATIVE PERCENT: 1.9 %
GLUCOSE: 101 MG/DL (ref 65–99)
HCT VFR BLD CALC: 43 % (ref 35–47)
HEMOGLOBIN: 14.5 G/DL (ref 11.7–15.5)
LYMPHOCYTE %: 14.9 %
MCH RBC QN AUTO: 30.2 PG (ref 27–34)
MCHC RBC AUTO-ENTMCNC: 33.7 G/DL (ref 32–36)
MCV RBC AUTO: 90 FL (ref 80–100)
MONOCYTES # BLD: 6.2 %
NEUTROPHILS RELATIVE PERCENT: 76.4 %
PDW BLD-RTO: 16 % (ref 11.5–15)
PLATELETS: 409 X10E9/L (ref 150–450)
PMV BLD AUTO: 7.7 FL (ref 7–12)
POTASSIUM SERPL-SCNC: 3.9 MMOL/L (ref 3.5–5)
RBC: 4.79 X10E12/L (ref 3.8–5.2)
SODIUM BLD-SCNC: 142 MMOL/L (ref 134–146)
TOTAL PROTEIN: 7 G/DL (ref 6–8)
WBC: 12.4 X10E9/L (ref 4–11)

## 2021-10-05 ENCOUNTER — APPOINTMENT (OUTPATIENT)
Dept: GENERAL RADIOLOGY | Age: 41
End: 2021-10-05
Payer: MEDICARE

## 2021-10-05 ENCOUNTER — HOSPITAL ENCOUNTER (EMERGENCY)
Age: 41
Discharge: HOME | End: 2021-10-05
Attending: EMERGENCY MEDICINE
Payer: MEDICARE

## 2021-10-05 VITALS
SYSTOLIC BLOOD PRESSURE: 163 MMHG | WEIGHT: 240 LBS | DIASTOLIC BLOOD PRESSURE: 117 MMHG | TEMPERATURE: 99.4 F | RESPIRATION RATE: 25 BRPM | HEART RATE: 130 BPM | OXYGEN SATURATION: 98 % | BODY MASS INDEX: 42.51 KG/M2

## 2021-10-05 DIAGNOSIS — J06.9 UPPER RESPIRATORY TRACT INFECTION, UNSPECIFIED TYPE: Primary | ICD-10-CM

## 2021-10-05 DIAGNOSIS — Z20.822 COVID-19 VIRUS TEST RESULT UNKNOWN: ICD-10-CM

## 2021-10-05 PROCEDURE — 99283 EMERGENCY DEPT VISIT LOW MDM: CPT

## 2021-10-05 PROCEDURE — 87636 SARSCOV2 & INF A&B AMP PRB: CPT

## 2021-10-05 PROCEDURE — 71045 X-RAY EXAM CHEST 1 VIEW: CPT

## 2021-10-05 PROCEDURE — 6370000000 HC RX 637 (ALT 250 FOR IP): Performed by: EMERGENCY MEDICINE

## 2021-10-05 RX ORDER — AZITHROMYCIN 250 MG/1
TABLET, FILM COATED ORAL
Qty: 4 TABLET | Refills: 0 | Status: SHIPPED | OUTPATIENT
Start: 2021-10-05 | End: 2021-10-28 | Stop reason: ALTCHOICE

## 2021-10-05 RX ORDER — AZITHROMYCIN 250 MG/1
500 TABLET, FILM COATED ORAL ONCE
Status: COMPLETED | OUTPATIENT
Start: 2021-10-05 | End: 2021-10-05

## 2021-10-05 RX ORDER — PREDNISONE 10 MG/1
TABLET ORAL
Qty: 30 TABLET | Refills: 0 | Status: SHIPPED
Start: 2021-10-05 | End: 2021-10-28 | Stop reason: SINTOL

## 2021-10-05 RX ORDER — PREDNISONE 20 MG/1
60 TABLET ORAL ONCE
Status: COMPLETED | OUTPATIENT
Start: 2021-10-05 | End: 2021-10-05

## 2021-10-05 RX ORDER — ACETAMINOPHEN 325 MG/1
650 TABLET ORAL ONCE
Status: COMPLETED | OUTPATIENT
Start: 2021-10-05 | End: 2021-10-05

## 2021-10-05 RX ADMIN — PREDNISONE 60 MG: 20 TABLET ORAL at 18:07

## 2021-10-05 RX ADMIN — ACETAMINOPHEN 650 MG: 325 TABLET ORAL at 18:06

## 2021-10-05 RX ADMIN — AZITHROMYCIN 500 MG: 250 TABLET, FILM COATED ORAL at 19:16

## 2021-10-05 ASSESSMENT — PAIN SCALES - GENERAL: PAINLEVEL_OUTOF10: 2

## 2021-10-05 NOTE — ED PROVIDER NOTES
[address] [facility]  [facility] [id number]  Phone: [address]    Chief Complaint   Patient presents with    Concern For COVID-19     exposure    Nasal Congestion    Cough       HPI    Davina Leon is a 42 y.o. female who presents noted complaint. Patient presents with cough congestion possible wheezing. Family is concerned there is been exposure to Covid. Her symptoms started today. Patient herself is nonverbal and has moderateMR. Otherwise been doing okay although had an episode of coughing and vomiting and mucus plugging last night. PAST MEDICAL HISTORY    Past Medical History:   Diagnosis Date    Autism     Moderately mentally retarded     Nausea & vomiting 2013    Patellar dislocation, left, initial encounter 8/10/2018    Seizures ([address]        SURGICAL HISTORY    Past Surgical History:   Procedure Laterality Date    CHOLECYSTECTOMY, LAPAROSCOPIC  04/05/2013    robotic assist multiport    NASAL POLYP SURGERY  2013    OPAL    [place] EGD BALLOON DILATION ESOPHAGUS <30 MM DIAM N/A 10/24/2017    EGD ESOPHAGOGASTRODUODENOSCOPY DILATATION performed by Agustina South MD at [facility]    UPPER GASTROINTESTINAL ENDOSCOPY         CURRENT MEDICATIONS    Current Outpatient Rx   Medication Sig Dispense Refill    azithromycin (ZITHROMAX) 250 MG tablet 1 p.o. daily 4 tablet 0    predniSONE (DELTASONE) 10 MG tablet 4 p.o. q. day for 3 days, 3 p.o. q. day for 3 days, 2 p.o. q. day for 3 days, one p.o. q. day for 3 days 30 tablet 0    Calcium Carbonate-Vitamin D 500-125 MG-UNIT TABS TAKE ONE TABLET, TWO TIMES A DAY, BY MOUTH. 180 tablet 3    triamcinolone (KENALOG) 0.1 % cream Apply topically 2 times daily. 60 g 3    phenytoin (DILANTIN) 100 MG ER capsule TAKE 3 CAPSULES, AT BED- TIME.  90 capsule 4    cetirizine (ZYRTEC) 10 MG tablet Take 1 tablet by mouth as needed for Allergies 90 tablet 3    medroxyPROGESTERone (DEPO-PROVERA) 150 MG/ML injection Inject 150 mg into the muscle every 3 months 1 mL 4    diclofenac (VOLTAREN) 50 MG EC tablet Take 50 mg by mouth 2 times daily      famotidine (PEPCID) 20 MG tablet Take 40 mg by mouth daily       dexlansoprazole (DEXILANT) 60 MG CPDR delayed release capsule Take 1 capsule by mouth daily 30 capsule 5    linaclotide (LINZESS) 290 MCG CAPS capsule Take 290 mcg by mouth every morning (before breakfast)         ALLERGIES    Allergies   Allergen Reactions    Seasonal        FAMILY HISTORY    Family History   Problem Relation Age of Onset    High Blood Pressure Mother     High Cholesterol Mother     Arthritis Father     Diabetes Father     High Blood Pressure Father     High Cholesterol Father     Heart Disease Maternal Grandfather     Diabetes Paternal Grandfather        SOCIAL HISTORY    Social History     Socioeconomic History    Marital status: Single     Spouse name: Not on file    Number of children: Not on file    Years of education: Not on file    Highest education level: Not on file   Occupational History    Not on file   Tobacco Use    Smoking status: Never Smoker    Smokeless tobacco: Never Used   Vaping Use    Vaping Use: Never used   Substance and Sexual Activity    Alcohol use: No    Drug use: No    Sexual activity: Never   Other Topics Concern    Not on file   Social History Narrative    Not on file     Social Determinants of Health     Financial Resource Strain:     Difficulty of Paying Living Expenses:    Food Insecurity:     Worried About Running Out of Food in the Last Year:     Ran Out of Food in the Last Year:    Transportation Needs:     Lack of Transportation (Medical):      Lack of Transportation (Non-Medical):    Physical Activity:     Days of Exercise per Week:     Minutes of Exercise per Session:    Stress:     Feeling of Stress :    Social Connections:     Frequency of Communication with Friends and Family: PM          PROCEDURES    none      CONSULTS:  None      CRITICAL CARE:  None    SCREENINGS  BP (!) 163/117   Pulse 130   Temp 99.4 °F (37.4 °C) (Temporal)   Resp 25   Wt 240 lb (108.9 kg)   SpO2 98%   BMI 42.51 kg/m²        Screening For Hypertension and Follow-up (#317)  patient informed that blood pressure is abnormal and in the pre-hypertension range and should be rechecked by primary care      Screening For Tobacco Use and Cessation Intervention (#226):   reports that she has never smoked. She has never used smokeless tobacco.  Non-smoker not applicable for screen       Antibiotic usage for acute bronchitis (age 25 years to 59 years) (#116)  Treating possible aspiration pneumonia        ED COURSE & MEDICAL DECISION MAKING    Pertinent Labs & Imaging studies reviewed. (See chart for details)  Patient presents with cough congestion. Family is concerned for possible exasperation pneumonia and/or Covid. This been a Covid exposure. Checking a chest x-ray. Ox level looks normal.  Going to cover her with some Zithromax and prednisone. Talk to the family the possibility or need for monoclonal antibodies if she is positive although clinically looks well currently. Checking chest x-ray to rule out other findings. REASSESSMENT  6:56 PM  Patient rechecked and updated on lab/xray status, progress and results. .  Patient was reassessed and condition was unchanged after tx. No further needs at this time. Chest x-ray shows some crowding although she has poor inspiratory effort. Chronic elevated hemidiaphragm. Counseled patient and family regards to her care. Mom's not sure if she will be able to tolerate a Covid test although she is willing to try. Covering her with Zithromax and prednisone either direction to rule out pneumonia and rule out Covid. FINAL IMPRESSION    1. Upper respiratory tract infection, unspecified type    2.  COVID-19 virus test result unknown         PATIENT REFERRED TO:  Deandre Greenberg Usman Tierney MD  [address] [facility]  [phone]    Call   For evaluation      DISCHARGE MEDICATIONS:  New Prescriptions    AZITHROMYCIN (ZITHROMAX) 250 MG TABLET    1 p.o. daily    PREDNISONE (DELTASONE) 10 MG TABLET    4 p.o. q. day for 3 days, 3 p.o. q. day for 3 days, 2 p.o. q. day for 3 days, one p.o. q. day for 3 days          Anderson Busby MD  10/05/21 2600

## 2021-10-05 NOTE — ED NOTES
Discharge instructions reviewed with patient's mother and questions answered. Skin warm and dry, color normal for ethnicity. Remains alert and cooperative. Denies further questions or concerns at this time.      Salma Ly RN  10/05/21 850 Ed [facility] Judy Thurman RN  10/05/21 [id number]

## 2021-10-06 ENCOUNTER — CARE COORDINATION (OUTPATIENT)
Dept: CARE COORDINATION | Age: 41
End: 2021-10-06

## 2021-10-06 LAB
INFLUENZA A: NOT DETECTED
INFLUENZA B: NOT DETECTED
SARS-COV-2 RNA, RT PCR: DETECTED

## 2021-10-06 NOTE — CARE COORDINATION
Jose Mendez was seen [facility] 10/5/2021. URI, Covid 19 test unknown. It has resulted out- Positive. Given Zithromax and Prednisone at discharge. 10/6/2021- 1:10 pm Left message requesting return call @ [phone] re: Initial ER/Covid F/U call. Received 2 incoming calls but no message left. 1:42 pm returned call and  requesting return call. Spoke with mother. She is aware that Covid test was positive. She is giving medication from ER. She stated she is sleeping a lot today. She is giving Tylenol for fever. She is coughing. She stated she is non verbal. She will reach out to PCP. She is vaccinated and will keep Trish in quarantine. She will take precautions- reviewed and she voiced understanding. Parent contacted regarding COVID-19 diagnosis. Discussed COVID-19 related testing which was available at this time. Test results were positive. Parent informed of results, if available? Yes. Ambulatory Care Manager contacted the patient by telephone to perform post discharge assessment. Call within 2 business days of discharge: Yes. Verified name and  with patient as identifiers. Provided introduction to self, and explanation of the CTN/ACM role, and reason for call due to risk factors for infection and/or exposure to COVID-19. Symptoms reviewed with parent who verbalized the following symptoms: fever, fatigue, cough, no new symptoms and no worsening symptoms. Due to no new or worsening symptoms encounter was not routed to provider for escalation. Discussed follow-up appointments. If no appointment was previously scheduled, appointment scheduling offered: Yes.   [facility] follow up appointment(s):   Future Appointments   Date Time Provider Eunice Tan   10/13/2021 11:20 AM Amrita Calderon MD [facility] [place]   2022  8:45 AM MD KALEIGH Carpio [email or web address]] [place]on [place]O [place]     Non-[facility] follow up appointment(s): n/a    Non-face-to-face services provided:  Obtained and reviewed discharge summary and/or continuity of care documents     Advance Care Planning:   Does patient have an Advance Directive:  not on file. Educated patient about risk for severe COVID-19 due to risk factors according to CDC guidelines. ACM reviewed discharge instructions, medical action plan and red flag symptoms with the parent who verbalized understanding. Discussed COVID vaccination status: Yes. Education provided on COVID-19 vaccination as appropriate. Discussed exposure protocols and quarantine with CDC Guidelines. Parent was given an opportunity to verbalize any questions and concerns and agrees to contact ACM or health care provider for questions related to their healthcare. Reviewed and educated parent on any new and changed medications related to discharge diagnosis     Was patient discharged with a pulse oximeter? No Discussed and confirmed pulse oximeter discharge instructions and when to notify provider or seek emergency care. ACM provided contact information. No further follow-up call identified based on severity of symptoms and risk factors.

## 2021-10-11 ENCOUNTER — TELEPHONE (OUTPATIENT)
Dept: FAMILY MEDICINE CLINIC | Age: 41
End: 2021-10-11

## 2021-10-12 NOTE — TELEPHONE ENCOUNTER
Spoke with Ellen Saxena , she says Helen Arroyo is feeling much better and rash looks better, she still is sleepy but continues to eat and drink.

## 2021-10-27 ENCOUNTER — TELEPHONE (OUTPATIENT)
Dept: FAMILY MEDICINE CLINIC | Age: 41
End: 2021-10-27

## 2021-10-27 DIAGNOSIS — N92.0 MENORRHAGIA WITH REGULAR CYCLE: Chronic | ICD-10-CM

## 2021-10-27 RX ORDER — MEDROXYPROGESTERONE ACETATE 150 MG/ML
150 INJECTION, SUSPENSION INTRAMUSCULAR
Qty: 1 ML | Refills: 4 | Status: SHIPPED | OUTPATIENT
Start: 2021-10-27 | End: 2022-08-04 | Stop reason: SDUPTHER

## 2021-10-27 NOTE — TELEPHONE ENCOUNTER
[facility] [place] She will be in tomorrow for appt.

## 2021-10-27 NOTE — TELEPHONE ENCOUNTER
Called mother and left message [facility]

## 2021-10-28 ENCOUNTER — OFFICE VISIT (OUTPATIENT)
Dept: FAMILY MEDICINE CLINIC | Age: 41
End: 2021-10-28
Payer: MEDICARE

## 2021-10-28 VITALS
HEART RATE: 117 BPM | WEIGHT: 234 LBS | BODY MASS INDEX: 41.45 KG/M2 | OXYGEN SATURATION: 97 % | DIASTOLIC BLOOD PRESSURE: 68 MMHG | SYSTOLIC BLOOD PRESSURE: 130 MMHG

## 2021-10-28 DIAGNOSIS — Z23 NEED FOR VACCINATION: ICD-10-CM

## 2021-10-28 DIAGNOSIS — R56.9 PARTIAL SEIZURE (HCC): ICD-10-CM

## 2021-10-28 DIAGNOSIS — F79 INTELLECTUAL DISABILITY: ICD-10-CM

## 2021-10-28 DIAGNOSIS — R63.5 WEIGHT GAIN: ICD-10-CM

## 2021-10-28 DIAGNOSIS — N92.0 MENORRHAGIA WITH REGULAR CYCLE: Primary | Chronic | ICD-10-CM

## 2021-10-28 DIAGNOSIS — K58.2 IRRITABLE BOWEL SYNDROME WITH BOTH CONSTIPATION AND DIARRHEA: ICD-10-CM

## 2021-10-28 DIAGNOSIS — J30.1 SEASONAL ALLERGIC RHINITIS DUE TO POLLEN: ICD-10-CM

## 2021-10-28 PROCEDURE — 99214 OFFICE O/P EST MOD 30 MIN: CPT | Performed by: FAMILY MEDICINE

## 2021-10-28 PROCEDURE — 1036F TOBACCO NON-USER: CPT | Performed by: FAMILY MEDICINE

## 2021-10-28 PROCEDURE — G0008 ADMIN INFLUENZA VIRUS VAC: HCPCS | Performed by: FAMILY MEDICINE

## 2021-10-28 PROCEDURE — 90674 CCIIV4 VAC NO PRSV 0.5 ML IM: CPT | Performed by: FAMILY MEDICINE

## 2021-10-28 PROCEDURE — G8417 CALC BMI ABV UP PARAM F/U: HCPCS | Performed by: FAMILY MEDICINE

## 2021-10-28 PROCEDURE — G8427 DOCREV CUR MEDS BY ELIG CLIN: HCPCS | Performed by: FAMILY MEDICINE

## 2021-10-28 PROCEDURE — G8482 FLU IMMUNIZE ORDER/ADMIN: HCPCS | Performed by: FAMILY MEDICINE

## 2021-10-28 RX ORDER — MEDROXYPROGESTERONE ACETATE 150 MG/ML
150 INJECTION, SUSPENSION INTRAMUSCULAR ONCE
Status: COMPLETED | OUTPATIENT
Start: 2021-10-28 | End: 2021-10-28

## 2021-10-28 RX ORDER — CETIRIZINE HYDROCHLORIDE 10 MG/1
10 TABLET ORAL PRN
Qty: 90 TABLET | Refills: 3 | Status: SHIPPED | OUTPATIENT
Start: 2021-10-28 | End: 2022-10-04 | Stop reason: SDUPTHER

## 2021-10-28 RX ORDER — DEXLANSOPRAZOLE 60 MG/1
CAPSULE, DELAYED RELEASE ORAL
Qty: 30 CAPSULE | Refills: 5
Start: 2021-10-28

## 2021-10-28 RX ORDER — PHENYTOIN SODIUM 100 MG/1
CAPSULE, EXTENDED RELEASE ORAL
Qty: 90 CAPSULE | Refills: 4 | Status: SHIPPED | OUTPATIENT
Start: 2021-10-28 | End: 2022-06-13

## 2021-10-28 RX ADMIN — MEDROXYPROGESTERONE ACETATE 150 MG: 150 INJECTION, SUSPENSION INTRAMUSCULAR at 11:40

## 2021-10-28 SDOH — ECONOMIC STABILITY: FOOD INSECURITY: WITHIN THE PAST 12 MONTHS, THE FOOD YOU BOUGHT JUST DIDN'T LAST AND YOU DIDN'T HAVE MONEY TO GET MORE.: NEVER TRUE

## 2021-10-28 SDOH — ECONOMIC STABILITY: FOOD INSECURITY: WITHIN THE PAST 12 MONTHS, YOU WORRIED THAT YOUR FOOD WOULD RUN OUT BEFORE YOU GOT MONEY TO BUY MORE.: NEVER TRUE

## 2021-10-28 ASSESSMENT — ENCOUNTER SYMPTOMS
SHORTNESS OF BREATH: 0
DIARRHEA: 1

## 2021-10-28 ASSESSMENT — SOCIAL DETERMINANTS OF HEALTH (SDOH): HOW HARD IS IT FOR YOU TO PAY FOR THE VERY BASICS LIKE FOOD, HOUSING, MEDICAL CARE, AND HEATING?: NOT VERY HARD

## 2021-10-28 NOTE — PROGRESS NOTES
Je Velarde (:  1980) is a 42 y.o. female,Established patient, here for evaluation of the following chief complaint(s):  6 Month Follow-Up         ASSESSMENT/PLAN:  1. Menorrhagia with regular cycle      Depo-Provera given. 2. Partial seizure ([address]  -     phenytoin (DILANTIN) 100 MG ER capsule; TAKE 3 CAPSULES, AT BED- TIME., Disp-90 capsule, R-4Normal  3. Seasonal allergic rhinitis due to pollen  -     cetirizine (ZYRTEC) 10 MG tablet; Take 1 tablet by mouth as needed for Allergies, Disp-90 tablet, R-3Normal  4. Need for vaccination  -     INFLUENZA, MDCK QUADV, 2 YRS AND OLDER, IM, PF, PREFILL SYR OR SDV, 0.5ML (FLUCELVAX QUADV, PF)  5. Irritable bowel syndrome with both constipation and diarrhea  -     dexlansoprazole (DEXILANT) 60 MG CPDR delayed release capsule; Per GI doctor, Disp-30 capsule, R-5NO PRINT  6. Intellectual disability    Continue current medication  Question Voltaren use -- mother will check with patient's home manager. Healthy eating plate recommended. Reduction of processed foods. Low sugar yogurt for probiotic. Consider Booster 3 months from now, depending on condition of pandemic. Currently she is essentially boosted as just had Covid. Labs done 2021 reviewed. Will follow labs next visit, especially glucose levels and lipids. Return in about 6 months (around 2022). Subjective   SUBJECTIVE/OBJECTIVE:  HPI     Patient with intellectual disability overall stable. No seizures. Living near parents but with another partner and a home managed by a home manager per se. Noted weight gain in last year. Mother notes food given is not great. Processed food common, easy to prepare. She brought up to manager who answers that these foods are what clients like. Mother reports patient will eat what she is served. S/p COVID19 10/5/21 but did well. Had vaccination in Feb.   Mother wondering about booster.     Wt Readings from Last 3 Encounters:   10/28/21 234 lb (106.1 kg)   10/05/21 240 lb (108.9 kg)   08/30/21 240 lb (108.9 kg)   10/2020 -- 215 lbs    Lab Results   Component Value Date    LABA1C 5.6 04/13/2021       Little diarrhea still, since covid19. H/o IBS. Food options given not great -- processed. Will do yogurt, but mother not sure how much sugar in that yogurt. Allergic rhinitis  -- stable. Currently season not a problem. Menorrhagia -- depo provera used to control. Rash in private are comes and goes. Review of Systems   Constitutional: Negative for fatigue and fever. Respiratory: Negative for shortness of breath. Cardiovascular: Negative for chest pain and leg swelling. Gastrointestinal: Positive for diarrhea (more like loose stool). Genitourinary: Positive for menstrual problem. Objective   Physical Exam    Gen: NAD, AAO, nonverbal. Brief eye contact. RRR, CTAB. An electronic signature was used to authenticate this note.     --Tierra Green MD

## 2021-10-28 NOTE — PROGRESS NOTES
Vaccine Information Sheet, \"Influenza - Inactivated\"  given to Marice Krabbe, or parent/legal guardian of  Marice Krabbe and verbalized understanding. Patient responses:    Have you ever had a reaction to a flu vaccine? No  Do you have an allergy to eggs, neomycin or polymixin? No  Do you have an allergy to Thimerosal, contact lens solution, or Merthiolate? No  Have you ever had Guillian Salmon Syndrome? No  Do you have any current illness? No  Do you have a temperature above 100 degrees? No  Are you pregnant? No  If pregnant, permission obtained from physician? No  Do you have an active neurological disorder? No      Flu vaccine given per order. Please see immunization tab.

## 2022-01-17 ENCOUNTER — NURSE ONLY (OUTPATIENT)
Dept: FAMILY MEDICINE CLINIC | Age: 42
End: 2022-01-17
Payer: MEDICARE

## 2022-01-17 VITALS — TEMPERATURE: 97.8 F

## 2022-01-17 DIAGNOSIS — N92.0 MENORRHAGIA WITH REGULAR CYCLE: Primary | ICD-10-CM

## 2022-01-17 PROCEDURE — 96372 THER/PROPH/DIAG INJ SC/IM: CPT | Performed by: FAMILY MEDICINE

## 2022-01-17 RX ORDER — MEDROXYPROGESTERONE ACETATE 150 MG/ML
150 INJECTION, SUSPENSION INTRAMUSCULAR ONCE
Status: COMPLETED | OUTPATIENT
Start: 2022-01-17 | End: 2022-01-17

## 2022-01-17 RX ADMIN — MEDROXYPROGESTERONE ACETATE 150 MG: 150 INJECTION, SUSPENSION INTRAMUSCULAR at 10:41

## 2022-04-28 ENCOUNTER — OFFICE VISIT (OUTPATIENT)
Dept: FAMILY MEDICINE CLINIC | Age: 42
End: 2022-04-28
Payer: MEDICARE

## 2022-04-28 VITALS
BODY MASS INDEX: 41.64 KG/M2 | HEART RATE: 92 BPM | WEIGHT: 235 LBS | DIASTOLIC BLOOD PRESSURE: 68 MMHG | SYSTOLIC BLOOD PRESSURE: 132 MMHG | HEIGHT: 63 IN

## 2022-04-28 DIAGNOSIS — J06.9 VIRAL URI: ICD-10-CM

## 2022-04-28 DIAGNOSIS — G81.11 RIGHT SPASTIC HEMIPARESIS (HCC): ICD-10-CM

## 2022-04-28 DIAGNOSIS — N92.0 MENORRHAGIA WITH REGULAR CYCLE: Chronic | ICD-10-CM

## 2022-04-28 DIAGNOSIS — E66.01 OBESITY, CLASS III, BMI 40-49.9 (MORBID OBESITY) (HCC): ICD-10-CM

## 2022-04-28 DIAGNOSIS — K58.2 IRRITABLE BOWEL SYNDROME WITH BOTH CONSTIPATION AND DIARRHEA: ICD-10-CM

## 2022-04-28 DIAGNOSIS — R56.9 PARTIAL SEIZURE (HCC): ICD-10-CM

## 2022-04-28 DIAGNOSIS — F79 INTELLECTUAL DISABILITY: Primary | ICD-10-CM

## 2022-04-28 PROCEDURE — 96372 THER/PROPH/DIAG INJ SC/IM: CPT | Performed by: FAMILY MEDICINE

## 2022-04-28 PROCEDURE — 99213 OFFICE O/P EST LOW 20 MIN: CPT | Performed by: FAMILY MEDICINE

## 2022-04-28 PROCEDURE — 1036F TOBACCO NON-USER: CPT | Performed by: FAMILY MEDICINE

## 2022-04-28 PROCEDURE — G8417 CALC BMI ABV UP PARAM F/U: HCPCS | Performed by: FAMILY MEDICINE

## 2022-04-28 PROCEDURE — G8427 DOCREV CUR MEDS BY ELIG CLIN: HCPCS | Performed by: FAMILY MEDICINE

## 2022-04-28 RX ORDER — MEDROXYPROGESTERONE ACETATE 150 MG/ML
150 INJECTION, SUSPENSION INTRAMUSCULAR ONCE
Status: COMPLETED | OUTPATIENT
Start: 2022-04-28 | End: 2022-04-28

## 2022-04-28 RX ORDER — FAMOTIDINE 40 MG/1
TABLET, FILM COATED ORAL
COMMUNITY
Start: 2022-04-04

## 2022-04-28 RX ADMIN — MEDROXYPROGESTERONE ACETATE 150 MG: 150 INJECTION, SUSPENSION INTRAMUSCULAR at 11:54

## 2022-04-28 ASSESSMENT — PATIENT HEALTH QUESTIONNAIRE - PHQ9
SUM OF ALL RESPONSES TO PHQ QUESTIONS 1-9: 0
SUM OF ALL RESPONSES TO PHQ QUESTIONS 1-9: 0
2. FEELING DOWN, DEPRESSED OR HOPELESS: 0
SUM OF ALL RESPONSES TO PHQ QUESTIONS 1-9: 0
1. LITTLE INTEREST OR PLEASURE IN DOING THINGS: 0
SUM OF ALL RESPONSES TO PHQ QUESTIONS 1-9: 0
SUM OF ALL RESPONSES TO PHQ9 QUESTIONS 1 & 2: 0

## 2022-04-28 NOTE — PROGRESS NOTES
Attending Physician Statement  I have discussed the case, including pertinent history and exam findings with the resident. I also have seen the patient and performed key portions of the examination. I agree with the documented assessment and plan as documented by the resident. Nikki Hines (:  1980) is a 42 y.o. female,Established patient, here for evaluation of the following chief complaint(s):  6 Month Follow-Up and Cough (just at night)       ASSESSMENT/PLAN:  1. Intellectual disability  2. Obesity, Class III, BMI 40-49.9 (morbid obesity) ([address]  3. Right spastic hemiparesis (HCC)  4. Partial seizure ([address]  5. Viral URI  6. Irritable bowel syndrome with both constipation and diarrhea  7. Menorrhagia with regular cycle -- control with DepoProvera 2014  -     medroxyPROGESTERone (DEPO-PROVERA) injection 150 mg; 150 mg, IntraMUSCular, ONCE, 1 dose, On Thu 22 at 1215     Overall stable clinically. Symptomatic care for cold. Continue current medications. You may try psyllium husk such as Metamucil or adding some probiotic foods to see if that helps with irritable bowel  If need albuterol, then nebulizer would be the better option     Return in about 6 months (around 10/28/2022). Subjective   SUBJECTIVE/OBJECTIVE:  HPI     Patient with history of intellectual disability, right spastic hemiparesis and partial seizure presents for follow-up. She thankfully is doing well clinically. Stable. No significant changes in her overall functioning. No seizures. 2-3 days of night time cough. Difficult to tell how much congestion as she swallows mucous. Tried robitussin without much improvement. Slight wetness of cough. H/o asthma    Needing DepoProvera, last  2022. Uses for menorrhagia. Doing well currently. Acid reflux. IBS -- seems to be doing okay in terms of stomach functioning. However she does have loose stool and then she seems to have constipation.   It is difficult to get vegetables consistently in to the diet. Past Surgical History:   Procedure Laterality Date    CHOLECYSTECTOMY, LAPAROSCOPIC  04/05/2013    robotic assist multiport    NASAL POLYP SURGERY  2013    OPAL    [place] EGD BALLOON DILATION ESOPHAGUS <30 MM DIAM N/A 10/24/2017    EGD ESOPHAGOGASTRODUODENOSCOPY DILATATION performed by Jose R Barger MD at [facility]           Wt Readings from Last 3 Encounters:   04/28/22 235 lb (106.6 kg)   10/28/21 234 lb (106.1 kg)   10/05/21 240 lb (108.9 kg)       Review of Systems   Constitutional: Negative for fatigue and fever. Respiratory: Negative for shortness of breath. Cardiovascular: Negative for chest pain and leg swelling. Objective   Physical Exam  Constitutional:       General: She is not in acute distress. Appearance: Normal appearance. She is not ill-appearing. Cardiovascular:      Rate and Rhythm: Normal rate and regular rhythm. Heart sounds: No murmur heard. Pulmonary:      Effort: Pulmonary effort is normal. No respiratory distress. Breath sounds: Normal breath sounds. No wheezing. Musculoskeletal:         General: No swelling. Neurological:      Mental Status: She is alert. Comments: Nonverbal.  Some eye contact   Psychiatric:         Mood and Affect: Mood normal.            An electronic signature was used to authenticate this note.     --Meron Hodges MD

## 2022-04-28 NOTE — PROGRESS NOTES
Destiny Michelle is a 42 y.o. female who presents today for:  Chief Complaint   Patient presents with    6 Month Follow-Up    Cough     just at night         HPI:   Destiny Michelle is 42 y.o. who presents today for 6 month follow up. Caregiver states that for the past 2 days, especially at night patient has had a cough. She is unsure if she has had any sputum production because the patient usually swallows before she coughs it up. Caregiver denies fevers and chills along with any other acute symptoms. The caregiver did notice that the mall yesterday patient did become slightly short of breath. She does have a history of asthma however she does not take any inhalers at this time. Caregiver is unsure if she can even tolerate an inhaler. While in room patient did have a cough that sounded congested in nature. Tried Robitussin without any relief. Caregivers asking about patient being able to receive Depo shot today. She has hx of Acid reflux and IBS.          Objective:     Vitals:    04/28/22 1036   BP: 132/68   Site: Left Upper Arm   Position: Sitting   Cuff Size: Large Adult   Pulse: 92   Weight: 235 lb (106.6 kg)   Height: 5' 3\" (1.6 m)       Wt Readings from Last 3 Encounters:   04/28/22 235 lb (106.6 kg)   10/28/21 234 lb (106.1 kg)   10/05/21 240 lb (108.9 kg)       BP Readings from Last 3 Encounters:   04/28/22 132/68   10/28/21 130/68   10/05/21 (!) 163/117       Lab Results   Component Value Date    WBC 12.4 (H) 08/30/2021    HGB 14.5 08/30/2021    HCT 43.0 08/30/2021    MCV 90 08/30/2021     08/30/2021     Lab Results   Component Value Date     08/30/2021    K 3.9 08/30/2021     08/30/2021    CO2 25 08/30/2021    BUN 13 08/30/2021    CREATININE 0.76 08/30/2021    GLUCOSE 101 (H) 08/30/2021    CALCIUM 9.3 08/30/2021    PROT 7.0 08/30/2021    LABALBU 4.5 08/30/2021    BILITOT 0.3 08/30/2021    ALKPHOS 123 08/30/2021    AST 19 08/30/2021    ALT 27 08/30/2021    LABGLOM >90 04/13/2021     Lab Results   Component Value Date    TSH 1.940 04/13/2021     Lab Results   Component Value Date    LABA1C 5.6 04/13/2021     No results found for: EAG  Lab Results   Component Value Date    CHOL 186 04/13/2021     Lab Results   Component Value Date    TRIG 198 04/13/2021     Lab Results   Component Value Date    HDL 40 04/13/2021     Lab Results   Component Value Date    LDLCALC 106 04/13/2021       No results found for: LABMICR, [id number]    Physical Exam  Constitutional:       General: She is not in acute distress. Appearance: She is not ill-appearing. HENT:      Head: Normocephalic and atraumatic. Nose: Nose normal.      Mouth/Throat:      Mouth: Mucous membranes are moist.   Eyes:      General: No scleral icterus. Conjunctiva/sclera: Conjunctivae normal.   Cardiovascular:      Rate and Rhythm: Normal rate and regular rhythm. Heart sounds: Normal heart sounds. No murmur heard. No friction rub. No gallop. Pulmonary:      Effort: Pulmonary effort is normal. No respiratory distress. Breath sounds: Normal breath sounds. No wheezing, rhonchi or rales. Chest:      Chest wall: No tenderness. Abdominal:      General: Abdomen is flat. There is no distension. Palpations: Abdomen is soft. Musculoskeletal:         General: No swelling. Cervical back: Normal range of motion and neck supple. No rigidity or tenderness. Right lower leg: No edema. Left lower leg: No edema. Skin:     General: Skin is warm and dry. Psychiatric:         Attention and Perception: She is inattentive. Speech: She is noncommunicative.          Immunization History   Administered Date(s) Administered    COVID-19, Alyssa Ok, Primary or Immunocompromised, PF, 100mcg/0.5mL 01/14/2021, 02/11/2021    Influenza, MDCK Quadv, IM, PF (Flucelvax 2 yrs and older) 10/28/2021    Influenza, Quadv, IM, (6 mo and older Fluzone, Flulaval, Fluarix and 3 yrs and older Afluria) 10/01/2018 Health Maintenance Due   Topic Date Due    HIV screen  Never done    Hepatitis C screen  Never done    DTaP/Tdap/Td vaccine (1 - Tdap) Never done    Cervical cancer screen  Never done    COVID-19 Vaccine (3 - Booster for Moderna series) 07/11/2021    Depression Screen  04/13/2022          Food Insecurity: No Food Insecurity    Worried About Running Out of Food in the Last Year: Never true    Vero of Food in the Last Year: Never true       Assessment / Plan:      Diagnosis Orders   1. Intellectual disability     2. Obesity, Class III, BMI 40-49.9 (morbid obesity) ([address]     3. Right spastic hemiparesis (HCC)     4. Partial seizure ([address]     5. Viral URI     6. Irritable bowel syndrome with both constipation and diarrhea     7. Menorrhagia with regular cycle -- control with DepoProvera 2014  medroxyPROGESTERone (DEPO-PROVERA) injection 150 mg     Cough likely viral in origin. Discussed Claritin 10 mg daily for few days, if needed Benadryl. Encouraged good water intake and humidifer. Consider psyllium husk to stabilize stool quality. Consider probiotic. Return in about 6 months (around 10/28/2022). Medications Prescribed:  Orders Placed This Encounter   Medications    medroxyPROGESTERone (DEPO-PROVERA) injection 150 mg       Future Appointments   Date Time Provider Eunice Tan   8/30/2022  8:45 AM Evelio Finley MD [facility] [address]       Patient given educational materials - see patient instructions. Discussed use, benefit, and sideeffects of prescribed medications. All patient questions answered. Pt voiced understanding. Reviewed health maintenance. Instructed to continue current medications, diet and exercise. Patient agreed with treatment plan. Follow up as directed.      Electronically signed by Marcelo Warner DO on 4/28/2022 at 12:04 PM

## 2022-04-28 NOTE — PATIENT INSTRUCTIONS
Consider psyllium husk (mixed with a yogurt or other drink  OR metamucil product) to stabilize stool quality. Consider probiotic foods -- activa vs kefir     For congestion and cough, claritin 10 mg daily for a few days. If needed use benadryl 25 mg at night for 2-3 nights. Good water intake.  Humidifier

## 2022-05-02 ASSESSMENT — ENCOUNTER SYMPTOMS: SHORTNESS OF BREATH: 0

## 2022-06-13 DIAGNOSIS — R56.9 PARTIAL SEIZURE (HCC): ICD-10-CM

## 2022-06-13 DIAGNOSIS — F84.0 AUTISM: Chronic | ICD-10-CM

## 2022-06-13 DIAGNOSIS — F79 INTELLECTUAL DISABILITY: ICD-10-CM

## 2022-06-13 RX ORDER — PHENYTOIN SODIUM 100 MG/1
CAPSULE, EXTENDED RELEASE ORAL
Qty: 90 CAPSULE | Refills: 4 | Status: SHIPPED | OUTPATIENT
Start: 2022-06-13 | End: 2022-09-19 | Stop reason: SDUPTHER

## 2022-06-14 NOTE — TELEPHONE ENCOUNTER
October follow up appt doesn't appear to have been made    Future Appointments   Date Time Provider Eunice Tan   8/30/2022  8:45 AM Tucker Maki MD [facility] [facility]

## 2022-08-04 ENCOUNTER — NURSE ONLY (OUTPATIENT)
Dept: FAMILY MEDICINE CLINIC | Age: 42
End: 2022-08-04
Payer: MEDICARE

## 2022-08-04 DIAGNOSIS — N92.0 MENORRHAGIA WITH REGULAR CYCLE: Chronic | ICD-10-CM

## 2022-08-04 DIAGNOSIS — N92.0 MENORRHAGIA WITH REGULAR CYCLE: Primary | ICD-10-CM

## 2022-08-04 PROCEDURE — 96372 THER/PROPH/DIAG INJ SC/IM: CPT | Performed by: FAMILY MEDICINE

## 2022-08-04 RX ORDER — MEDROXYPROGESTERONE ACETATE 150 MG/ML
150 INJECTION, SUSPENSION INTRAMUSCULAR
Qty: 1 ML | Refills: 4 | Status: SHIPPED | OUTPATIENT
Start: 2022-08-04

## 2022-08-04 RX ORDER — MEDROXYPROGESTERONE ACETATE 150 MG/ML
150 INJECTION, SUSPENSION INTRAMUSCULAR ONCE
Status: COMPLETED | OUTPATIENT
Start: 2022-08-04 | End: 2022-08-04

## 2022-08-04 RX ADMIN — MEDROXYPROGESTERONE ACETATE 150 MG: 150 INJECTION, SUSPENSION INTRAMUSCULAR at 15:46

## 2022-08-04 NOTE — TELEPHONE ENCOUNTER
Marielena Goins called requesting a refill on the following medications:  Requested Prescriptions     Pending Prescriptions Disp Refills    medroxyPROGESTERone (DEPO-PROVERA) 150 MG/ML injection 1 mL 4     Sig: Inject 150 mg into the muscle every 3 months       Date of last visit: 4/28/2022  Date of next visit (if applicable):10/4/2022  Date of last refill:   Pharmacy Name: [id number]    Patient here for injection and mother states medication needs refill.       Thanks,  Michelle Emerson, CMA

## 2022-08-04 NOTE — PROGRESS NOTES
After obtaining consent, and per orders of Russell Baldwin MD  Immunization(s) and/or medication(s) given during visit by Katerine Byrd CMA      Administrations This Visit       medroxyPROGESTERone (DEPO-PROVERA) injection 150 mg       Admin Date  08/04/2022  15:46 Action  Given Dose  150 mg Route  IntraMUSCular Site  Dorsogluteal Right Administered By  Katerine Byrd CMA    Ordering Provider: Russell Baldwin MD    Patient Supplied?: Yes

## 2022-09-19 ENCOUNTER — OFFICE VISIT (OUTPATIENT)
Dept: NEUROLOGY | Age: 42
End: 2022-09-19
Payer: MEDICARE

## 2022-09-19 VITALS
WEIGHT: 244 LBS | BODY MASS INDEX: 43.23 KG/M2 | HEART RATE: 97 BPM | HEIGHT: 63 IN | OXYGEN SATURATION: 98 % | RESPIRATION RATE: 17 BRPM | DIASTOLIC BLOOD PRESSURE: 72 MMHG | SYSTOLIC BLOOD PRESSURE: 132 MMHG

## 2022-09-19 DIAGNOSIS — R56.9 PARTIAL SEIZURE (HCC): Primary | ICD-10-CM

## 2022-09-19 PROCEDURE — G8427 DOCREV CUR MEDS BY ELIG CLIN: HCPCS | Performed by: NURSE PRACTITIONER

## 2022-09-19 PROCEDURE — G8417 CALC BMI ABV UP PARAM F/U: HCPCS | Performed by: NURSE PRACTITIONER

## 2022-09-19 PROCEDURE — 1036F TOBACCO NON-USER: CPT | Performed by: NURSE PRACTITIONER

## 2022-09-19 PROCEDURE — 99213 OFFICE O/P EST LOW 20 MIN: CPT | Performed by: NURSE PRACTITIONER

## 2022-09-19 RX ORDER — PHENYTOIN SODIUM 100 MG/1
CAPSULE, EXTENDED RELEASE ORAL
Qty: 90 CAPSULE | Refills: 4 | Status: SHIPPED | OUTPATIENT
Start: 2022-09-19

## 2022-09-19 NOTE — PATIENT INSTRUCTIONS
Continue Dilantin. Refills given. Continue calcium and vitamin D. Report any new events. Call if any questions or concerns. CBC, Dilantin level, hepatic panel ordered. Follow up in 12 months or sooner if needed.

## 2022-09-19 NOTE — PROGRESS NOTES
NEUROLOGY OUT PATIENT FOLLOW UP NOTE:  9/19/20229:05 AM    Wendy Morrow is here for follow up for seizure. ROS:  Respiratory : no cough, no shortness of breath  Cardiac: no chest pain. No palpitations. Renal : no flank pain, no hematuria    Skin: no rash      Allergies   Allergen Reactions    Seasonal        Current Outpatient Medications:     medroxyPROGESTERone (DEPO-PROVERA) 150 MG/ML injection, Inject 150 mg into the muscle every 3 months, Disp: 1 mL, Rfl: 4    phenytoin (DILANTIN) 100 MG ER capsule, TAKE 3 CAPSULES, AT BED- TIME., Disp: 90 capsule, Rfl: 4    Calcium Carbonate-Vitamin D 500-125 MG-UNIT TABS, TAKE ONE TABLET, TWO TIMES A DAY, BY MOUTH., Disp: 180 tablet, Rfl: 3    diclofenac (VOLTAREN) 50 MG EC tablet, , Disp: , Rfl:     famotidine (PEPCID) 40 MG tablet, , Disp: , Rfl:     cetirizine (ZYRTEC) 10 MG tablet, Take 1 tablet by mouth as needed for Allergies, Disp: 90 tablet, Rfl: 3    dexlansoprazole (DEXILANT) 60 MG CPDR delayed release capsule, Per GI doctor, Disp: 30 capsule, Rfl: 5    triamcinolone (KENALOG) 0.1 % cream, Apply topically 2 times daily. , Disp: 60 g, Rfl: 3    linaclotide (LINZESS) 290 MCG CAPS capsule, Take 290 mcg by mouth every morning (before breakfast), Disp: , Rfl:     I reviewed the past medical history, allergies, medications, social history and family history. PE:   Vitals:    09/19/22 0852   BP: 132/72   Pulse: 97   Resp: 17   SpO2: 98%   Weight: 244 lb (110.7 kg)   Height: 5' 3\" (1.6 m)     General Appearance: ? She is non verbral. Mumbles. well developed, well nourished, obese and non verbal. no icterus  Neck:?There is no carotid bruits. The Neck is supple. Neuro:?CN 2-12 grossly intact with no focal deficits. Power 5/5 throughout, ?right wrist flexion spasticity. ? Reflexes are symmetric. Cerebellar exam is Intact. Sensory exam is intact to light touch. ?Gait is intact. No lower extremity edema.               DATA:      Results for orders placed or performed during the hospital encounter of 10/05/21   COVID-19 & Influenza Combo    Specimen: Nasopharyngeal Swab   Result Value Ref Range    SARS-CoV-2 RNA, RT PCR DETECTED (AA) NOT DETECTED    INFLUENZA A NOT DETECTED NOT DETECTED    INFLUENZA B NOT DETECTED NOT DETECTED             Assessment:     Diagnosis Orders   1. Partial seizure (HCC)  phenytoin (DILANTIN) 100 MG ER capsule           She is doing well. No reported spells or seizure. She is on dilantin. No side effects noted. She is compliant. She is taking calcium and vitamin D supplements. After detailed discussion with patient's care giver we agreed on the following plan. Plan:  Continue Dilantin. Refills given. Continue calcium and vitamin D. Report any new events. Call if any questions or concerns. CBC, Dilantin level, hepatic panel ordered. Follow up in 12 months or sooner if needed.        Total time 20 min    RIN Lechuga - CNP

## 2022-09-26 ENCOUNTER — HOSPITAL ENCOUNTER (OUTPATIENT)
Age: 42
Discharge: HOME | End: 2022-09-26
Payer: MEDICARE

## 2022-09-26 DIAGNOSIS — R56.9 PARTIAL SEIZURE (HCC): ICD-10-CM

## 2022-09-26 LAB
BASOPHILS # BLD: 0.5 %
BASOPHILS ABSOLUTE: 0.1 THOU/MM3 (ref 0–0.1)
EOSINOPHIL # BLD: 2.8 %
EOSINOPHILS ABSOLUTE: 0.4 THOU/MM3 (ref 0–0.4)
ERYTHROCYTE [DISTWIDTH] IN BLOOD BY AUTOMATED COUNT: 13.5 % (ref 11.5–14.5)
ERYTHROCYTE [DISTWIDTH] IN BLOOD BY AUTOMATED COUNT: 44.4 FL (ref 35–45)
HCT VFR BLD CALC: 42.9 % (ref 37–47)
HEMOGLOBIN: 14 GM/DL (ref 12–16)
IMMATURE GRANS (ABS): 0.15 THOU/MM3 (ref 0–0.07)
IMMATURE GRANULOCYTES: 1.1 %
LYMPHOCYTES # BLD: 19 %
LYMPHOCYTES ABSOLUTE: 2.5 THOU/MM3 (ref 1–4.8)
MCH RBC QN AUTO: 29.4 PG (ref 26–33)
MCHC RBC AUTO-ENTMCNC: 32.6 GM/DL (ref 32.2–35.5)
MCV RBC AUTO: 89.9 FL (ref 81–99)
MONOCYTES # BLD: 7.5 %
MONOCYTES ABSOLUTE: 1 THOU/MM3 (ref 0.4–1.3)
NUCLEATED RED BLOOD CELLS: 0 /100 WBC
PLATELET # BLD: 386 THOU/MM3 (ref 130–400)
PMV BLD AUTO: 9.5 FL (ref 9.4–12.4)
RBC # BLD: 4.77 MILL/MM3 (ref 4.2–5.4)
SEG NEUTROPHILS: 69.1 %
SEGMENTED NEUTROPHILS ABSOLUTE COUNT: 9.1 THOU/MM3 (ref 1.8–7.7)
WBC # BLD: 13.1 THOU/MM3 (ref 4.8–10.8)

## 2022-09-26 PROCEDURE — 80076 HEPATIC FUNCTION PANEL: CPT

## 2022-09-26 PROCEDURE — 36415 COLL VENOUS BLD VENIPUNCTURE: CPT

## 2022-09-26 PROCEDURE — 80185 ASSAY OF PHENYTOIN TOTAL: CPT

## 2022-09-26 PROCEDURE — 85025 COMPLETE CBC W/AUTO DIFF WBC: CPT

## 2022-09-27 LAB
ALBUMIN SERPL-MCNC: 4.2 G/DL (ref 3.5–5.1)
ALP BLD-CCNC: 148 U/L (ref 38–126)
ALT SERPL-CCNC: 16 U/L (ref 11–66)
AST SERPL-CCNC: 11 U/L (ref 5–40)
BILIRUB SERPL-MCNC: < 0.2 MG/DL (ref 0.3–1.2)
BILIRUBIN DIRECT: < 0.2 MG/DL (ref 0–0.3)
PHENYTOIN LEVEL: 4.9 UG/ML (ref 6–18)
TOTAL PROTEIN: 7 G/DL (ref 6.1–8)

## 2022-10-04 ENCOUNTER — OFFICE VISIT (OUTPATIENT)
Dept: FAMILY MEDICINE CLINIC | Age: 42
End: 2022-10-04
Payer: MEDICARE

## 2022-10-04 VITALS
HEIGHT: 63 IN | OXYGEN SATURATION: 96 % | WEIGHT: 243.6 LBS | HEART RATE: 126 BPM | DIASTOLIC BLOOD PRESSURE: 72 MMHG | TEMPERATURE: 97 F | RESPIRATION RATE: 16 BRPM | SYSTOLIC BLOOD PRESSURE: 136 MMHG | BODY MASS INDEX: 43.16 KG/M2

## 2022-10-04 DIAGNOSIS — N92.0 MENORRHAGIA WITH REGULAR CYCLE: Chronic | ICD-10-CM

## 2022-10-04 DIAGNOSIS — R56.9 PARTIAL SEIZURE (HCC): Chronic | ICD-10-CM

## 2022-10-04 DIAGNOSIS — F84.0 AUTISM: Chronic | ICD-10-CM

## 2022-10-04 DIAGNOSIS — Z00.00 ANNUAL PHYSICAL EXAM: Primary | ICD-10-CM

## 2022-10-04 DIAGNOSIS — J30.1 SEASONAL ALLERGIC RHINITIS DUE TO POLLEN: ICD-10-CM

## 2022-10-04 DIAGNOSIS — F79 INTELLECTUAL DISABILITY: ICD-10-CM

## 2022-10-04 PROCEDURE — G0438 PPPS, INITIAL VISIT: HCPCS | Performed by: FAMILY MEDICINE

## 2022-10-04 PROCEDURE — G8484 FLU IMMUNIZE NO ADMIN: HCPCS | Performed by: FAMILY MEDICINE

## 2022-10-04 PROCEDURE — G8417 CALC BMI ABV UP PARAM F/U: HCPCS | Performed by: FAMILY MEDICINE

## 2022-10-04 PROCEDURE — 99214 OFFICE O/P EST MOD 30 MIN: CPT | Performed by: FAMILY MEDICINE

## 2022-10-04 PROCEDURE — G8427 DOCREV CUR MEDS BY ELIG CLIN: HCPCS | Performed by: FAMILY MEDICINE

## 2022-10-04 PROCEDURE — 1036F TOBACCO NON-USER: CPT | Performed by: FAMILY MEDICINE

## 2022-10-04 RX ORDER — CETIRIZINE HYDROCHLORIDE 10 MG/1
10 TABLET ORAL PRN
Qty: 90 TABLET | Refills: 3 | Status: SHIPPED | OUTPATIENT
Start: 2022-10-04

## 2022-10-04 NOTE — PROGRESS NOTES
Well Adult Note  Name: Lynette Wan Date: 10/4/2022   MRN: [id number] Sex: Female   Age: 36 y.o. Ethnicity: Non- / Non    : 1980 Race: White (non-)      Reagan Sacks is here for well adult exam.  History:    Patient with autism, partial seizure and intellectual disability presenting with mother for follow-up. Patient has been about the same in health but has gained weight. Patient is not living with mother but lives in a group home for the extra needs and support. Activity is somewhat limited but diet does include a good amount of processed foods. Mother has discussed this with the group home leader and they are trying to make some adjustments. Unfortunately has taken time for the group home and leadership to recognize diet links with the patient's weight gain    Participation in the Special [employer] for [employer]ling has been desired for the patient she is done in the past.  This would increase activity. She has had no recent injuries or reports of gait trouble. No joint issues are noted. patient uses Depo-Provera every 3 months to control her menorrhagia. Pros and cons of therapy have been discussed. At this time the benefits are greater than the disadvantages. Patient is not able to care for her menstruation through hygiene as she is dependent on others. Kenalog cream used for dermatitis related to dry skin during winter along with urinary incontinence. Constipation is better. Bms are good  -- linzess  Allergies seem to be well controlled. Patient has not experienced seizures. Follows with neurology    Wt Readings from Last 3 Encounters:   10/04/22 243 lb 9.6 oz (110.5 kg)   22 244 lb (110.7 kg)   22 235 lb (106.6 kg)       Review of Systems  No fevers or chills. Patient remains fairly healthy during the year. No difficulty breathing is noted. No chest pain. No dizziness. Seems to have a good appetite.     Allergies   Allergen Reactions Seasonal          Prior to Visit Medications    Medication Sig Taking? Authorizing Provider   cetirizine (ZYRTEC) 10 MG tablet Take 1 tablet by mouth as needed for Allergies Yes Siddhartha Oro MD   phenytoin (DILANTIN) 100 MG ER capsule TAKE 3 CAPSULES, AT BED- TIME. Yes RIN López - CNP   medroxyPROGESTERone (DEPO-PROVERA) 150 MG/ML injection Inject 150 mg into the muscle every 3 months Yes Siddhartha Oro MD   Calcium Carbonate-Vitamin D 500-125 MG-UNIT TABS TAKE ONE TABLET, TWO TIMES A DAY, BY MOUTH. Yes Siddhartha Oro MD   diclofenac (VOLTAREN) 50 MG EC tablet  Yes Historical Provider, MD   famotidine (PEPCID) 40 MG tablet  Yes Historical Provider, MD   dexlansoprazole (DEXILANT) 60 MG CPDR delayed release capsule Per GI doctor Yes Siddhartha Oro MD   triamcinolone (KENALOG) 0.1 % cream Apply topically 2 times daily.  Yes Siddhartha Oro MD   linaclotide (LINZESS) 290 MCG CAPS capsule Take 290 mcg by mouth every morning (before breakfast) Yes Historical Provider, MD         Past Medical History:   Diagnosis Date    Autism     Moderately mentally retarded     Nausea & vomiting 2013    Patellar dislocation, left, initial encounter 8/10/2018    Seizures ([facility] [address]        Past Surgical History:   Procedure Laterality Date    CHOLECYSTECTOMY, LAPAROSCOPIC  04/05/2013    robotic assist multiport    NASAL POLYP SURGERY  2013    OPAL    FL EGD BALLOON DILATION ESOPHAGUS <30 MM DIAM N/A 10/24/2017    EGD ESOPHAGOGASTRODUODENOSCOPY DILATATION performed by Elaine Amin MD at [facility]           Family History   Problem Relation Age of Onset    High Blood Pressure Mother     High Cholesterol Mother     Arthritis Father     Diabetes Father     High Blood Pressure Father     High Cholesterol Father     Heart Disease Maternal Grandfather     Diabetes Paternal Grandfather        Social History     Tobacco Use    Smoking status: Never    Smokeless tobacco: Never   Vaping Use    Vaping Use: Never used   Substance Use Topics    Alcohol use: No    Drug use: No       Objective   /72   Pulse (!) 126   Temp 97 °F (36.1 °C)   Resp 16   Ht 5' 3\" (1.6 m)   Wt 243 lb 9.6 oz (110.5 kg)   SpO2 96%   BMI 43.15 kg/m²   Wt Readings from Last 3 Encounters:   10/04/22 243 lb 9.6 oz (110.5 kg)   09/19/22 244 lb (110.7 kg)   04/28/22 235 lb (106.6 kg)     There were no vitals filed for this visit. Physical Exam  In general the patient appears to be in no acute distress. She does not respond verbally. She does not respond emotionally and has mostly a flat facies but is apprehensive initially during the exam.  Conjunctive is clear bilaterally. Oropharynx appears to be clear but limited exam.  Neck without lymphadenopathy. Lungs are clear to auscultation and the heart regular rate and rhythm. Extremities show no edema. She was able to move arms and elbows without trouble. And patient's gait seems acceptable for desired activity. No current rashes on exposed skin. Assessment   Plan   1. Annual physical exam  2. Partial seizure ([address]  3. Autism  4. Menorrhagia with regular cycle -- control with DepoProvera 2014  5. Seasonal allergic rhinitis due to pollen  -     cetirizine (ZYRTEC) 10 MG tablet; Take 1 tablet by mouth as needed for Allergies, Disp-90 tablet, R-3Normal  6.  Intellectual disability     Okay to participate in bowling activity    Personalized Preventive Plan   Current Health Maintenance Status  Immunization History   Administered Date(s) Administered    COVID-19, MODERNA BLUE border, Primary or Immunocompromised, (age 12y+), IM, 100 mcg/0.5mL 01/14/2021, 02/11/2021    Influenza, AFLURIA (age 1 yrs+), FLUZONE, (age 10 mo+), MDV, 0.5mL 10/01/2018    Influenza, FLUCELVAX, (age 10 mo+), MDCK, PF, 0.5mL 10/28/2021        Health Maintenance   Topic Date Due    HIV screen  Never done    Hepatitis C screen  Never done    DTaP/Tdap/Td vaccine (1 - Tdap) Never done    Cervical cancer screen  Never done    COVID-19 Vaccine (3 - Booster for Moderna series) 07/11/2021    Flu vaccine (1) 08/01/2022    Depression Screen  04/28/2023    Diabetes screen  04/13/2024    Lipids  04/13/2026    Hepatitis A vaccine  Aged Out    Hepatitis B vaccine  Aged Out    Hib vaccine  Aged Out    Meningococcal (ACWY) vaccine  Aged Out    Pneumococcal 0-64 years Vaccine  Aged Out     Recommendations for [facility] Due: see orders and patient instructions/AVS.    Return in about 6 months (around 4/4/2023).

## 2022-10-11 ENCOUNTER — TELEMEDICINE (OUTPATIENT)
Dept: FAMILY MEDICINE CLINIC | Age: 42
End: 2022-10-11
Payer: MEDICARE

## 2022-10-11 VITALS
SYSTOLIC BLOOD PRESSURE: 130 MMHG | TEMPERATURE: 97.8 F | BODY MASS INDEX: 41.53 KG/M2 | HEIGHT: 63 IN | DIASTOLIC BLOOD PRESSURE: 72 MMHG | HEART RATE: 115 BPM | RESPIRATION RATE: 16 BRPM | WEIGHT: 234.4 LBS | OXYGEN SATURATION: 98 %

## 2022-10-11 DIAGNOSIS — H61.21 RIGHT EAR IMPACTED CERUMEN: ICD-10-CM

## 2022-10-11 DIAGNOSIS — Z23 INFLUENZA VACCINE NEEDED: ICD-10-CM

## 2022-10-11 DIAGNOSIS — J06.9 VIRAL URI: ICD-10-CM

## 2022-10-11 DIAGNOSIS — J34.89 NASAL DRAINAGE: Primary | ICD-10-CM

## 2022-10-11 LAB
Lab: NORMAL
QC PASS/FAIL: NORMAL
SARS-COV-2 RDRP RESP QL NAA+PROBE: NEGATIVE

## 2022-10-11 PROCEDURE — 87635 SARS-COV-2 COVID-19 AMP PRB: CPT | Performed by: NURSE PRACTITIONER

## 2022-10-11 PROCEDURE — G0008 ADMIN INFLUENZA VIRUS VAC: HCPCS | Performed by: NURSE PRACTITIONER

## 2022-10-11 PROCEDURE — 90674 CCIIV4 VAC NO PRSV 0.5 ML IM: CPT | Performed by: NURSE PRACTITIONER

## 2022-10-11 PROCEDURE — 99213 OFFICE O/P EST LOW 20 MIN: CPT | Performed by: NURSE PRACTITIONER

## 2022-10-11 RX ORDER — DEXTROMETHORPHAN POLISTIREX 30 MG/5ML
60 SUSPENSION ORAL 2 TIMES DAILY PRN
Qty: 1 EACH | Refills: 0 | Status: SHIPPED | OUTPATIENT
Start: 2022-10-11 | End: 2022-10-21

## 2022-10-11 ASSESSMENT — ENCOUNTER SYMPTOMS
CHEST TIGHTNESS: 0
SHORTNESS OF BREATH: 0
WHEEZING: 0
GASTROINTESTINAL NEGATIVE: 1
SORE THROAT: 0
COUGH: 1
SINUS PAIN: 1

## 2022-10-11 NOTE — PROGRESS NOTES
Lily Dewitt (:  1980) is a 36 y.o. female,Established patient, here for evaluation of the following chief complaint(s):  Sinus Problem (Has been gong on since last week. Diarhhea, sinus pressure, vomitting, runny nose)         ASSESSMENT/PLAN:  1. Nasal drainage  -     POCT COVID-19 Rapid, NAAT  2. Right ear impacted cerumen  -     Ear wax removal  -     carbamide peroxide (DEBROX) 6.5 % otic solution; Place 5 drops in ear(s) 2 times daily, Disp-1 each, R-0Normal  3. Viral URI  -     dextromethorphan (DELSYM) 30 MG/5ML extended release liquid; Take 10 mLs by mouth 2 times daily as needed for Cough, Disp-1 each, R-0Normal  -     carbamide peroxide (DEBROX) 6.5 % otic solution; Place 5 drops in ear(s) 2 times daily, Disp-1 each, R-0Normal  4. Influenza vaccine needed  -     Influenza, FLUCELVAX, (age 10 mo+), IM, Preservative Free, 0.5 mL    Return if symptoms worsen or fail to improve. Covid Test is negative. Drink lots of fluids. Medications as directed. Flu shot given today. Rest.   Patient was not able to tolerate cerumen removal. Recommend she use debrox for 24-48 hours. Subjective   SUBJECTIVE/OBJECTIVE:  ARI  Eric Hobbs is a 36year old female who is accompanied by a caregiver. She is here for evaluation of nasal drainage, congestion, and cough. Onset of symptoms 4 days ago. Review of Systems   Constitutional:  Positive for fatigue. Negative for chills and fever. HENT:  Positive for congestion, postnasal drip and sinus pain. Negative for ear pain and sore throat. Respiratory:  Positive for cough. Negative for chest tightness, shortness of breath and wheezing. Cardiovascular: Negative. Gastrointestinal: Negative. Genitourinary: Negative. Musculoskeletal: Negative. Objective   Physical Exam  Constitutional:       General: She is not in acute distress. Appearance: Normal appearance. She is not ill-appearing. HENT:      Head: Normocephalic and atraumatic. Right Ear: Ear canal normal. There is impacted cerumen. Left Ear: Hearing, tympanic membrane, ear canal and external ear normal.      Nose: Congestion present. Right Turbinates: Swollen. Left Turbinates: Swollen. Mouth/Throat:      Comments: Resisted opening mouth  Cardiovascular:      Rate and Rhythm: Normal rate and regular rhythm. Pulses: Normal pulses. Heart sounds: Normal heart sounds. Pulmonary:      Breath sounds: Normal breath sounds. Skin:     General: Skin is warm and dry. Neurological:      Mental Status: She is alert. An electronic signature was used to authenticate this note.     --Ardeth Fothergill, RIN - CNP

## 2022-10-11 NOTE — PROGRESS NOTES
Vaccine Information Sheet, \"Influenza - Inactivated\"  given to Supriya Hanna, or parent/legal guardian of  Supriya Hanna and verbalized understanding. Patient responses:    Have you ever had a reaction to a flu vaccine? No  Do you have an allergy to eggs, neomycin or polymixin? No  Do you have an allergy to Thimerosal, contact lens solution, or Merthiolate? No  Have you ever had Guillian Florence Syndrome? No  Do you have any current illness? No  Do you have a temperature above 100 degrees? No  Are you pregnant? No  If pregnant, permission obtained from physician? No  Do you have an active neurological disorder? No      Flu vaccine given per order. Please see immunization tab.

## 2022-11-07 ENCOUNTER — TELEPHONE (OUTPATIENT)
Dept: FAMILY MEDICINE CLINIC | Age: 42
End: 2022-11-07

## 2022-11-07 NOTE — TELEPHONE ENCOUNTER
----- Message from Aracelis Doll sent at 11/7/2022 12:07 PM EST -----  Subject: Message to Provider    QUESTIONS  Information for Provider? Bhavin Joe her primary care giver calling to set up a   depo for Marlene Hassan. Not sure if isangus Joe on hippa or not. she takes care   of Marlene Hassan. cb to schedule it Catherine Lopez will  the depo shot today.   ---------------------------------------------------------------------------  --------------  Madison Baires INFO  [phone]; OK to leave message on voicemail  ---------------------------------------------------------------------------  --------------  SCRIPT ANSWERS  Relationship to Patient? Third Party  Third Party Type? Other  Other Third Party Type? friend  Representative Name?  Bhavin Joe

## 2022-11-08 ENCOUNTER — NURSE ONLY (OUTPATIENT)
Dept: FAMILY MEDICINE CLINIC | Age: 42
End: 2022-11-08
Payer: MEDICARE

## 2022-11-08 DIAGNOSIS — N92.0 MENORRHAGIA WITH REGULAR CYCLE: Primary | ICD-10-CM

## 2022-11-08 PROCEDURE — 96372 THER/PROPH/DIAG INJ SC/IM: CPT | Performed by: FAMILY MEDICINE

## 2022-11-08 RX ORDER — MEDROXYPROGESTERONE ACETATE 150 MG/ML
150 INJECTION, SUSPENSION INTRAMUSCULAR ONCE
Status: COMPLETED | OUTPATIENT
Start: 2022-11-08 | End: 2022-11-08

## 2022-11-08 RX ADMIN — MEDROXYPROGESTERONE ACETATE 150 MG: 150 INJECTION, SUSPENSION INTRAMUSCULAR at 08:32

## 2022-11-08 NOTE — PROGRESS NOTES
Medication(s) given during visit:    Administrations This Visit       medroxyPROGESTERone (DEPO-PROVERA) injection 150 mg       Admin Date  11/08/2022  08:32 Action  Given Dose  150 mg Route  IntraMUSCular Site  Dorsogluteal Left Administered By  Jimmie Allen MA    Ordering Provider: Polly Burgos MD    NDC: 79340-869-53    Lot#: [id number]    : [facility]    Patient Supplied?: Yes                    Patient instructed to remain in clinic for 20 minutes after injection and was advised to report any adverse reaction to me immediately. Pt tolerated will with no reaction.

## 2023-02-14 ENCOUNTER — NURSE ONLY (OUTPATIENT)
Dept: FAMILY MEDICINE CLINIC | Age: 43
End: 2023-02-14
Payer: MEDICARE

## 2023-02-14 VITALS — TEMPERATURE: 97.8 F | OXYGEN SATURATION: 98 %

## 2023-02-14 DIAGNOSIS — N92.0 MENORRHAGIA WITH REGULAR CYCLE: Primary | ICD-10-CM

## 2023-02-14 PROCEDURE — 96372 THER/PROPH/DIAG INJ SC/IM: CPT | Performed by: FAMILY MEDICINE

## 2023-02-14 RX ORDER — MEDROXYPROGESTERONE ACETATE 150 MG/ML
150 INJECTION, SUSPENSION INTRAMUSCULAR ONCE
Status: COMPLETED | OUTPATIENT
Start: 2023-02-14 | End: 2023-02-14

## 2023-02-14 RX ADMIN — MEDROXYPROGESTERONE ACETATE 150 MG: 150 INJECTION, SUSPENSION INTRAMUSCULAR at 09:44

## 2023-02-14 NOTE — PROGRESS NOTES
Pt is about 4-5 days late on her Depo Shot. Pt unable to urinate. Verified with Amanda Stack, Practice Manager and Dr. Consuelo Dan that it was OK to give shot.

## 2023-04-11 ENCOUNTER — HOSPITAL ENCOUNTER (OUTPATIENT)
Age: 43
Discharge: HOME | End: 2023-04-11
Payer: MEDICARE

## 2023-04-11 DIAGNOSIS — G81.11 RIGHT SPASTIC HEMIPARESIS (HCC): ICD-10-CM

## 2023-04-11 DIAGNOSIS — E66.01 OBESITY, CLASS III, BMI 40-49.9 (MORBID OBESITY) (HCC): ICD-10-CM

## 2023-04-11 DIAGNOSIS — R03.0 ELEVATED BLOOD PRESSURE READING: ICD-10-CM

## 2023-04-11 DIAGNOSIS — R73.09 ELEVATED GLUCOSE: ICD-10-CM

## 2023-04-11 DIAGNOSIS — R56.9 PARTIAL SEIZURE (HCC): ICD-10-CM

## 2023-04-11 DIAGNOSIS — R63.5 WEIGHT GAIN: ICD-10-CM

## 2023-04-11 DIAGNOSIS — R06.02 SOB (SHORTNESS OF BREATH) ON EXERTION: ICD-10-CM

## 2023-04-11 LAB
ALBUMIN SERPL BCG-MCNC: 4.7 G/DL (ref 3.5–5.1)
ALP SERPL-CCNC: 172 U/L (ref 38–126)
ALT SERPL W/O P-5'-P-CCNC: 17 U/L (ref 11–66)
ANION GAP SERPL CALC-SCNC: 14 MEQ/L (ref 8–16)
AST SERPL-CCNC: 12 U/L (ref 5–40)
BILIRUB SERPL-MCNC: < 0.2 MG/DL (ref 0.3–1.2)
BUN SERPL-MCNC: 9 MG/DL (ref 7–22)
CALCIUM SERPL-MCNC: 9.6 MG/DL (ref 8.5–10.5)
CHLORIDE SERPL-SCNC: 105 MEQ/L (ref 98–111)
CO2 SERPL-SCNC: 24 MEQ/L (ref 23–33)
CREAT SERPL-MCNC: 0.5 MG/DL (ref 0.4–1.2)
DEPRECATED MEAN GLUCOSE BLD GHB EST-ACNC: 126 MG/DL (ref 70–126)
GFR SERPL CREATININE-BSD FRML MDRD: > 60 ML/MIN/1.73M2
GLUCOSE SERPL-MCNC: 108 MG/DL (ref 70–108)
HBA1C MFR BLD HPLC: 6.2 % (ref 4.4–6.4)
POTASSIUM SERPL-SCNC: 4.5 MEQ/L (ref 3.5–5.2)
PROT SERPL-MCNC: 7.2 G/DL (ref 6.1–8)
SODIUM SERPL-SCNC: 143 MEQ/L (ref 135–145)
TSH SERPL DL<=0.005 MIU/L-ACNC: 2.18 UIU/ML (ref 0.4–4.2)

## 2023-04-11 PROCEDURE — 80053 COMPREHEN METABOLIC PANEL: CPT

## 2023-04-11 PROCEDURE — 36415 COLL VENOUS BLD VENIPUNCTURE: CPT

## 2023-04-11 PROCEDURE — 83036 HEMOGLOBIN GLYCOSYLATED A1C: CPT

## 2023-04-11 PROCEDURE — 93010 ELECTROCARDIOGRAM REPORT: CPT | Performed by: INTERNAL MEDICINE

## 2023-04-11 PROCEDURE — 93005 ELECTROCARDIOGRAM TRACING: CPT

## 2023-04-11 PROCEDURE — 84443 ASSAY THYROID STIM HORMONE: CPT

## 2023-04-11 NOTE — RESULT ENCOUNTER NOTE
Please let mother of patient know that laboratories are showing thyroid, kidney, electrolytes and liver seem okay. The A1c test is pending still  There is slight bump in alkaline phosphatase which we'll follow in the future. The EKG shows a fast heart rate and some abnormal waves. The CXR suggest a borderline enlarged heart and right lower lobe pneumonia, which is odd as patient is not having other symptoms of infection. Has patient been exposed to illness in the group home or covid19 in the last 2-3 months? Recommendations:   -- will cover with doxycycline antibiotic in case of brewing infection. Such was sent to pharmacy. -- usually improved lung expansion is done via gentle exercise, or incentive spirometer.   Gentle walking is okay, but patient unlikely able to follow instructions for IS.  -- obtaining an echocardiogram, seeing a heart doctor

## 2023-04-12 ENCOUNTER — TELEPHONE (OUTPATIENT)
Dept: FAMILY MEDICINE CLINIC | Age: 43
End: 2023-04-12

## 2023-04-23 LAB
EKG ATRIAL RATE: 102 BPM
EKG P AXIS: 34 DEGREES
EKG P-R INTERVAL: 116 MS
EKG Q-T INTERVAL: 368 MS
EKG QRS DURATION: 88 MS
EKG QTC CALCULATION (BAZETT): 479 MS
EKG R AXIS: 80 DEGREES
EKG T AXIS: 19 DEGREES
EKG VENTRICULAR RATE: 102 BPM

## 2023-04-26 ENCOUNTER — OFFICE VISIT (OUTPATIENT)
Dept: CARDIOLOGY CLINIC | Age: 43
End: 2023-04-26
Payer: MEDICARE

## 2023-04-26 VITALS
HEART RATE: 76 BPM | WEIGHT: 236 LBS | DIASTOLIC BLOOD PRESSURE: 70 MMHG | BODY MASS INDEX: 41.82 KG/M2 | HEIGHT: 63 IN | SYSTOLIC BLOOD PRESSURE: 136 MMHG

## 2023-04-26 DIAGNOSIS — R06.02 SHORTNESS OF BREATH: Primary | ICD-10-CM

## 2023-04-26 PROCEDURE — 99204 OFFICE O/P NEW MOD 45 MIN: CPT | Performed by: INTERNAL MEDICINE

## 2023-04-26 PROCEDURE — G8427 DOCREV CUR MEDS BY ELIG CLIN: HCPCS | Performed by: INTERNAL MEDICINE

## 2023-04-26 PROCEDURE — 1036F TOBACCO NON-USER: CPT | Performed by: INTERNAL MEDICINE

## 2023-04-26 PROCEDURE — G8417 CALC BMI ABV UP PARAM F/U: HCPCS | Performed by: INTERNAL MEDICINE

## 2023-04-26 NOTE — PROGRESS NOTES
New patient here for check - sob tachy PCP ref    Pt is non verbal per her mother     Pt had chest xray showing enlarged heart and pnuemonia
TRIG 198 04/13/2021 11:21 AM    HDL 40 04/13/2021 11:21 AM    LDLCALC 106 04/13/2021 11:21 AM       Lab Results   Component Value Date/Time    TSH 2.180 04/11/2023 10:08 AM         Testing Reviewed:      I haveindividually reviewed the below cardiac tests    EKG:    ECHO: No results found for this or any previous visit. STRESS:    CATH:    Assessment/Plan       Diagnosis Orders   1. Shortness of breath  Echo 2D w doppler w color complete          Borderline enlarged heart on CXR  Recent pneumonia  Nonverbal  Autistic    Will get Echo  Patient is in a group home  Today present with family  Reviewed EKG and CXR  The patient is asked to make an attempt to improve diet and exercise patterns to aid in medical management of this problem. Advised more plant based nutrition/meditarrean diet   Advised patient to call office or seek immediate medical attention if there is any new onset of  any chest pain, sob, palpitations, lightheadedness, dizziness, orthopnea, PND or pedal edema. All medication side effects were discussed in details. Thank youfor allowing me to participate in the care of this patient. Please do not hesitate to contact me for any further questions. Return in about 4 weeks (around 5/24/2023), or if symptoms worsen or fail to improve, for Review testing, Regular follow up.        Electronically signed by Alphonzo Bernheim, MD [facility]  4/26/2023 at 10:58 AM EDT

## 2023-05-03 ENCOUNTER — HOSPITAL ENCOUNTER (OUTPATIENT)
Dept: NON INVASIVE DIAGNOSTICS | Age: 43
Discharge: HOME | End: 2023-05-03
Payer: MEDICARE

## 2023-05-03 DIAGNOSIS — R06.02 SHORTNESS OF BREATH: ICD-10-CM

## 2023-05-03 LAB
LV EF: 58 %
LVEF MODALITY: NORMAL

## 2023-05-03 PROCEDURE — 93306 TTE W/DOPPLER COMPLETE: CPT

## 2023-05-08 ENCOUNTER — NURSE ONLY (OUTPATIENT)
Dept: FAMILY MEDICINE CLINIC | Age: 43
End: 2023-05-08
Payer: MEDICARE

## 2023-05-08 DIAGNOSIS — N92.0 MENORRHAGIA WITH REGULAR CYCLE: Primary | ICD-10-CM

## 2023-05-08 PROCEDURE — 96372 THER/PROPH/DIAG INJ SC/IM: CPT | Performed by: FAMILY MEDICINE

## 2023-05-08 RX ORDER — MEDROXYPROGESTERONE ACETATE 150 MG/ML
150 INJECTION, SUSPENSION INTRAMUSCULAR ONCE
Status: COMPLETED | OUTPATIENT
Start: 2023-05-08 | End: 2023-05-08

## 2023-05-08 RX ORDER — MEDROXYPROGESTERONE ACETATE 150 MG/ML
150 INJECTION, SUSPENSION INTRAMUSCULAR ONCE
Qty: 1 ML | Refills: 3 | Status: CANCELLED
Start: 2023-05-08 | End: 2023-05-08

## 2023-05-08 RX ADMIN — MEDROXYPROGESTERONE ACETATE 150 MG: 150 INJECTION, SUSPENSION INTRAMUSCULAR at 15:42

## 2023-05-08 NOTE — PROGRESS NOTES
Patient came in for a nurse visit to receive her Medroxyprogesterone shot, patient received shot in left glute and tolerated well. No questions or concerns at this time.

## 2023-05-31 ENCOUNTER — OFFICE VISIT (OUTPATIENT)
Dept: CARDIOLOGY CLINIC | Age: 43
End: 2023-05-31
Payer: MEDICARE

## 2023-05-31 VITALS
DIASTOLIC BLOOD PRESSURE: 78 MMHG | BODY MASS INDEX: 43.23 KG/M2 | HEART RATE: 84 BPM | WEIGHT: 244 LBS | SYSTOLIC BLOOD PRESSURE: 122 MMHG | HEIGHT: 63 IN

## 2023-05-31 DIAGNOSIS — Z09 FOLLOW-UP EXAM: Primary | ICD-10-CM

## 2023-05-31 PROCEDURE — G8417 CALC BMI ABV UP PARAM F/U: HCPCS | Performed by: INTERNAL MEDICINE

## 2023-05-31 PROCEDURE — G8428 CUR MEDS NOT DOCUMENT: HCPCS | Performed by: INTERNAL MEDICINE

## 2023-05-31 PROCEDURE — 1036F TOBACCO NON-USER: CPT | Performed by: INTERNAL MEDICINE

## 2023-05-31 PROCEDURE — 99213 OFFICE O/P EST LOW 20 MIN: CPT | Performed by: INTERNAL MEDICINE

## 2023-05-31 NOTE — PROGRESS NOTES
Pt here for 4 week fu echo     Pt mother states med list is correct  from  5/8/23did not bring a med list     Pt continues with sob , swelling    Parents wanting results of echo

## 2023-05-31 NOTE — PROGRESS NOTES
[address] [facility] [address]  Dept: [phone]  Dept Fax: [phone]  Loc: [phone]    Visit Date: 5/31/2023    Ms. Kevin Gastelum is a 36 y.o. female  who presented for:  Chief Complaint   Patient presents with    Check-Up    Shortness of Breath       HPI:   37 yo F c hx of Autism, Non-verbal, and seizures, BMI 42 is here for abnormal CXR. EKG shows Sinus tachycardia with TWI. She was having  pneumonia at the time of EKG. Completed abx recently. Labs wnl. She is nonverbal at baseline. She underwent echo which showed no abnormalities. Current Outpatient Medications:     cetirizine (ZYRTEC) 10 MG tablet, Take 1 tablet by mouth as needed for Allergies, Disp: 90 tablet, Rfl: 3    phenytoin (DILANTIN) 100 MG ER capsule, TAKE 3 CAPSULES, AT BED- TIME., Disp: 90 capsule, Rfl: 4    medroxyPROGESTERone (DEPO-PROVERA) 150 MG/ML injection, Inject 150 mg into the muscle every 3 months, Disp: 1 mL, Rfl: 4    Calcium Carbonate-Vitamin D 500-125 MG-UNIT TABS, TAKE ONE TABLET, TWO TIMES A DAY, BY MOUTH. (Patient taking differently: TAKE ONE TABLET, TWO TIMES A DAY, BY MOUTH. Calcium 600 mg), Disp: 180 tablet, Rfl: 3    diclofenac (VOLTAREN) 50 MG EC tablet, , Disp: , Rfl:     famotidine (PEPCID) 40 MG tablet, , Disp: , Rfl:     dexlansoprazole (DEXILANT) 60 MG CPDR delayed release capsule, Per GI doctor, Disp: 30 capsule, Rfl: 5    triamcinolone (KENALOG) 0.1 % cream, Apply topically 2 times daily. , Disp: 60 g, Rfl: 3    linaclotide (LINZESS) 290 MCG CAPS capsule, Take 1 capsule by mouth every morning (before breakfast), Disp: , Rfl:     Past Medical History  Devon Cm  has a past medical history of Autism, Moderately mentally retarded, Nausea & vomiting, Patellar dislocation, left, initial encounter, and Seizures ([address] Social History  Devon Cm  reports that she has never smoked.  She has never used smokeless tobacco. She reports

## 2023-06-13 NOTE — TELEPHONE ENCOUNTER
1) 12 hour fasting blood work (ok to drink water): Please obtain blood work (12 hour without food, 24 hours no alcohol, drink plenty of water, ok to take medications). Twelve hours prior to draw, do not take multivitamins or dietary supplements containing biotin or vitamin B7 that are commonly found in multivitamins or hair, skin, and nail supplements.  For patients receiving high biotin doses (> 5 mg/day), a sample should be taken at least 8 hours after the last biotin administration.      2) After blood work, if lab is unremarkable, then we will plan to get a prior authorization for IV Reclast from your insurance company and inform you.     Side effects of Reclast include, but not limited to,  flu-like symptoms that may last 1-14 days, or VERY RARE osteonecrosis of jaw (non-healing ulcer of gum) and atypical fracture of femur (snapping of hip bone).     If you decide to take Reclast, I advise you to take tylenol 500mg-650mg right before therapy and every 6-8hours for 3 days total then stop afterwards. Also, drink plenty of water prior to, and after Reclast therapy for 3 days.    Recommend routine dental exam.    If you have flu-like symptoms or muscle ache that is intolerable lasting more than 2 days after Reclast, please inform us. We may need to use oral steroid few days (anywhere from 3-7 days) if this occurs.       3) Follow up in 1 year : About 1 week prior to follow up, obtain fasting blood work.   Patricia Iron called requesting a refill on the following medications:  Requested Prescriptions     Pending Prescriptions Disp Refills    cetirizine (ZYRTEC) 10 MG tablet 90 tablet 3     Sig: Take 1 tablet by mouth as needed for Allergies       Date of last visit: 8/28/2019  Date of next visit (if applicable):N/A  Date of last refill: 05/07/2018  Pharmacy Name: [facility]      Thanks,  Declan Sheffield, [address] [place], [place] [address]shravan

## 2023-07-12 DIAGNOSIS — R56.9 PARTIAL SEIZURE (HCC): Primary | ICD-10-CM

## 2023-07-13 RX ORDER — PHENYTOIN SODIUM 100 MG/1
CAPSULE, EXTENDED RELEASE ORAL
Qty: 90 CAPSULE | Refills: 5 | Status: SHIPPED | OUTPATIENT
Start: 2023-07-13

## 2023-07-18 ENCOUNTER — OFFICE VISIT (OUTPATIENT)
Dept: FAMILY MEDICINE CLINIC | Age: 43
End: 2023-07-18
Payer: MEDICARE

## 2023-07-18 VITALS
TEMPERATURE: 96.7 F | OXYGEN SATURATION: 98 % | RESPIRATION RATE: 20 BRPM | SYSTOLIC BLOOD PRESSURE: 138 MMHG | HEART RATE: 99 BPM | DIASTOLIC BLOOD PRESSURE: 86 MMHG | BODY MASS INDEX: 43.36 KG/M2 | HEIGHT: 62 IN | WEIGHT: 235.6 LBS

## 2023-07-18 DIAGNOSIS — R73.03 PREDIABETES: Primary | ICD-10-CM

## 2023-07-18 DIAGNOSIS — R03.0 ELEVATED BLOOD PRESSURE READING: ICD-10-CM

## 2023-07-18 DIAGNOSIS — R06.02 SOB (SHORTNESS OF BREATH) ON EXERTION: ICD-10-CM

## 2023-07-18 DIAGNOSIS — F79 INTELLECTUAL DISABILITY: ICD-10-CM

## 2023-07-18 DIAGNOSIS — F84.0 AUTISM: Chronic | ICD-10-CM

## 2023-07-18 DIAGNOSIS — Z00.00 ENCOUNTER FOR WELL ADULT EXAM WITHOUT ABNORMAL FINDINGS: ICD-10-CM

## 2023-07-18 DIAGNOSIS — R74.8 ELEVATED ALKALINE PHOSPHATASE LEVEL: ICD-10-CM

## 2023-07-18 PROCEDURE — 99214 OFFICE O/P EST MOD 30 MIN: CPT | Performed by: FAMILY MEDICINE

## 2023-07-18 ASSESSMENT — ENCOUNTER SYMPTOMS
SHORTNESS OF BREATH: 0
CONSTIPATION: 0
NAUSEA: 0
RHINORRHEA: 0
PHOTOPHOBIA: 0
DIARRHEA: 0
VOMITING: 0
COUGH: 0

## 2023-07-18 ASSESSMENT — PATIENT HEALTH QUESTIONNAIRE - PHQ9
SUM OF ALL RESPONSES TO PHQ9 QUESTIONS 1 & 2: 0
SUM OF ALL RESPONSES TO PHQ QUESTIONS 1-9: 0
1. LITTLE INTEREST OR PLEASURE IN DOING THINGS: 0
2. FEELING DOWN, DEPRESSED OR HOPELESS: 0
SUM OF ALL RESPONSES TO PHQ QUESTIONS 1-9: 0

## 2023-07-18 NOTE — PROGRESS NOTES
Well Adult Note  Name: Leah Liu Date: 2023   MRN: [id number] Sex: Female   Age: 36 y.o. Ethnicity: Non- / Non    : 1980 Race: White (non-)      Kimber Engle is here for well adult exam.  Chief Complaint   Patient presents with    Annual Exam     3 mo follow up for weight and bp. Mom denies any new issues or concerns. Overall doing better. Patient with autism, intellectual disability, non-verbal. Here with mother. Also has prediabetes and had elevated alkaline phos. 2023 -- will do dental care. She will be sedated. Dentist requesting H&P and an okay for her to receive sedation. To be done at [address] [facility]. Fax [phone]      SOB -- appears resolved, much better, s/p viral illness is felt to be the cause, possibly covid19, had for months. Also lost weight -- better environment. Saw cardio -- echo and EKG done, okay. Lost weight, roommate is not there. Eating less and less snacks   Activity -- more walking with nicer weather. BM emptied daily and is continent. Urinary incontinence at night -- chronic.   Limited night time fluids     Wt Readings from Last 3 Encounters:   23 235 lb 9.6 oz (106.9 kg)   23 244 lb (110.7 kg)   23 236 lb (107 kg)     BP Readings from Last 10 Encounters:   23 (!) 141/92   23 122/78   23 136/70   23 (!) 152/92   10/11/22 130/72   10/04/22 136/72   22 132/72   22 132/68   10/28/21 130/68   10/05/21 (!) 163/117     Lab Results   Component Value Date    CHOL 186 2021     Lab Results   Component Value Date    TRIG 198 2021     Lab Results   Component Value Date    HDL 40 2021     Lab Results   Component Value Date    LDLCALC 106 2021     No results found for: LABVLDL, VLDL  No results found for: [facility]  Lab Results   Component Value Date     2023    K 4.5 2023     2023    CO2 24 2023    BUN 9

## 2023-08-07 ENCOUNTER — NURSE ONLY (OUTPATIENT)
Dept: FAMILY MEDICINE CLINIC | Age: 43
End: 2023-08-07
Payer: MEDICARE

## 2023-08-07 DIAGNOSIS — N92.0 MENORRHAGIA WITH REGULAR CYCLE: Primary | ICD-10-CM

## 2023-08-07 PROCEDURE — 96372 THER/PROPH/DIAG INJ SC/IM: CPT | Performed by: FAMILY MEDICINE

## 2023-08-07 RX ORDER — MEDROXYPROGESTERONE ACETATE 150 MG/ML
150 INJECTION, SUSPENSION INTRAMUSCULAR ONCE
Status: COMPLETED | OUTPATIENT
Start: 2023-08-07 | End: 2023-08-07

## 2023-08-07 RX ADMIN — MEDROXYPROGESTERONE ACETATE 150 MG: 150 INJECTION, SUSPENSION INTRAMUSCULAR at 15:21

## 2023-10-07 ENCOUNTER — HOSPITAL ENCOUNTER (OUTPATIENT)
Age: 43
Discharge: HOME | End: 2023-10-07
Payer: MEDICARE

## 2023-10-07 DIAGNOSIS — F84.0 AUTISM: Chronic | ICD-10-CM

## 2023-10-07 DIAGNOSIS — R73.03 PREDIABETES: ICD-10-CM

## 2023-10-07 DIAGNOSIS — Z00.00 ENCOUNTER FOR WELL ADULT EXAM WITHOUT ABNORMAL FINDINGS: ICD-10-CM

## 2023-10-07 DIAGNOSIS — F79 INTELLECTUAL DISABILITY: ICD-10-CM

## 2023-10-07 DIAGNOSIS — R74.8 ELEVATED ALKALINE PHOSPHATASE LEVEL: ICD-10-CM

## 2023-10-07 DIAGNOSIS — R03.0 ELEVATED BLOOD PRESSURE READING: ICD-10-CM

## 2023-10-07 LAB
ALBUMIN SERPL BCG-MCNC: 4.2 G/DL (ref 3.5–5.1)
ALP SERPL-CCNC: 158 U/L (ref 38–126)
ALT SERPL W/O P-5'-P-CCNC: 16 U/L (ref 11–66)
ANION GAP SERPL CALC-SCNC: 12 MEQ/L (ref 8–16)
AST SERPL-CCNC: 13 U/L (ref 5–40)
BASOPHILS ABSOLUTE: 0 THOU/MM3 (ref 0–0.1)
BASOPHILS NFR BLD AUTO: 0.4 %
BILIRUB CONJ SERPL-MCNC: < 0.2 MG/DL (ref 0–0.3)
BILIRUB SERPL-MCNC: 0.2 MG/DL (ref 0.3–1.2)
BUN SERPL-MCNC: 7 MG/DL (ref 7–22)
CALCIUM SERPL-MCNC: 9.1 MG/DL (ref 8.5–10.5)
CHLORIDE SERPL-SCNC: 105 MEQ/L (ref 98–111)
CHOLEST SERPL-MCNC: 186 MG/DL (ref 100–199)
CO2 SERPL-SCNC: 24 MEQ/L (ref 23–33)
CREAT SERPL-MCNC: 0.6 MG/DL (ref 0.4–1.2)
DEPRECATED MEAN GLUCOSE BLD GHB EST-ACNC: 111 MG/DL (ref 70–126)
DEPRECATED RDW RBC AUTO: 45.8 FL (ref 35–45)
EOSINOPHIL NFR BLD AUTO: 4.5 %
EOSINOPHILS ABSOLUTE: 0.5 THOU/MM3 (ref 0–0.4)
ERYTHROCYTE [DISTWIDTH] IN BLOOD BY AUTOMATED COUNT: 13.9 % (ref 11.5–14.5)
GFR SERPL CREATININE-BSD FRML MDRD: > 60 ML/MIN/1.73M2
GLUCOSE SERPL-MCNC: 114 MG/DL (ref 70–108)
HBA1C MFR BLD HPLC: 5.7 % (ref 4.4–6.4)
HCT VFR BLD AUTO: 47.1 % (ref 37–47)
HDLC SERPL-MCNC: 40 MG/DL
HGB BLD-MCNC: 15.5 GM/DL (ref 12–16)
IMM GRANULOCYTES # BLD AUTO: 0.1 THOU/MM3 (ref 0–0.07)
IMM GRANULOCYTES NFR BLD AUTO: 0.8 %
LDLC SERPL CALC-MCNC: 114 MG/DL
LYMPHOCYTES ABSOLUTE: 1.6 THOU/MM3 (ref 1–4.8)
LYMPHOCYTES NFR BLD AUTO: 13.7 %
MCH RBC QN AUTO: 29.7 PG (ref 26–33)
MCHC RBC AUTO-ENTMCNC: 32.9 GM/DL (ref 32.2–35.5)
MCV RBC AUTO: 90.2 FL (ref 81–99)
MONOCYTES ABSOLUTE: 0.8 THOU/MM3 (ref 0.4–1.3)
MONOCYTES NFR BLD AUTO: 6.5 %
NEUTROPHILS NFR BLD AUTO: 74.1 %
NRBC BLD AUTO-RTO: 0 /100 WBC
PLATELET # BLD AUTO: 397 THOU/MM3 (ref 130–400)
PMV BLD AUTO: 9.5 FL (ref 9.4–12.4)
POTASSIUM SERPL-SCNC: 4.4 MEQ/L (ref 3.5–5.2)
PROT SERPL-MCNC: 7 G/DL (ref 6.1–8)
RBC # BLD AUTO: 5.22 MILL/MM3 (ref 4.2–5.4)
SEGMENTED NEUTROPHILS ABSOLUTE COUNT: 8.8 THOU/MM3 (ref 1.8–7.7)
SODIUM SERPL-SCNC: 141 MEQ/L (ref 135–145)
TRIGL SERPL-MCNC: 158 MG/DL (ref 0–199)
WBC # BLD AUTO: 11.9 THOU/MM3 (ref 4.8–10.8)

## 2023-10-07 PROCEDURE — 83036 HEMOGLOBIN GLYCOSYLATED A1C: CPT

## 2023-10-07 PROCEDURE — 85025 COMPLETE CBC W/AUTO DIFF WBC: CPT

## 2023-10-07 PROCEDURE — 36415 COLL VENOUS BLD VENIPUNCTURE: CPT

## 2023-10-07 PROCEDURE — 80053 COMPREHEN METABOLIC PANEL: CPT

## 2023-10-07 PROCEDURE — 80061 LIPID PANEL: CPT

## 2023-10-07 PROCEDURE — 82248 BILIRUBIN DIRECT: CPT

## 2023-10-16 ENCOUNTER — OFFICE VISIT (OUTPATIENT)
Dept: NEUROLOGY | Age: 43
End: 2023-10-16
Payer: MEDICARE

## 2023-10-16 VITALS
BODY MASS INDEX: 43.61 KG/M2 | OXYGEN SATURATION: 99 % | DIASTOLIC BLOOD PRESSURE: 86 MMHG | SYSTOLIC BLOOD PRESSURE: 136 MMHG | HEIGHT: 62 IN | HEART RATE: 100 BPM | WEIGHT: 237 LBS

## 2023-10-16 DIAGNOSIS — R56.9 PARTIAL SEIZURE (HCC): Primary | ICD-10-CM

## 2023-10-16 PROCEDURE — G8484 FLU IMMUNIZE NO ADMIN: HCPCS | Performed by: PSYCHIATRY & NEUROLOGY

## 2023-10-16 PROCEDURE — 1036F TOBACCO NON-USER: CPT | Performed by: PSYCHIATRY & NEUROLOGY

## 2023-10-16 PROCEDURE — 99213 OFFICE O/P EST LOW 20 MIN: CPT | Performed by: PSYCHIATRY & NEUROLOGY

## 2023-10-16 PROCEDURE — G8427 DOCREV CUR MEDS BY ELIG CLIN: HCPCS | Performed by: PSYCHIATRY & NEUROLOGY

## 2023-10-16 PROCEDURE — G8417 CALC BMI ABV UP PARAM F/U: HCPCS | Performed by: PSYCHIATRY & NEUROLOGY

## 2023-10-16 NOTE — PATIENT INSTRUCTIONS
Continue Dilantin. Refills given. Dilantin level. Continue calcium and vitamin D. Report any new events. Call if any questions or concerns. Follow up in 12 months or sooner if needed.

## 2023-10-16 NOTE — PROGRESS NOTES
NEUROLOGY OUT PATIENT FOLLOW UP NOTE:  10/16/72895:41 PM    Polly Peterson is here for follow up for seizure. Allergies   Allergen Reactions    Seasonal        Current Outpatient Medications:     phenytoin (DILANTIN) 100 MG ER capsule, TAKE THREE CAPSULES, DAILY AT BEDTIME, BY MOUTH., Disp: 90 capsule, Rfl: 5    cetirizine (ZYRTEC) 10 MG tablet, Take 1 tablet by mouth as needed for Allergies, Disp: 90 tablet, Rfl: 3    medroxyPROGESTERone (DEPO-PROVERA) 150 MG/ML injection, Inject 150 mg into the muscle every 3 months, Disp: 1 mL, Rfl: 4    Calcium Carbonate-Vitamin D 500-125 MG-UNIT TABS, TAKE ONE TABLET, TWO TIMES A DAY, BY MOUTH. (Patient taking differently: TAKE ONE TABLET, TWO TIMES A DAY, BY MOUTH. Calcium 600 mg), Disp: 180 tablet, Rfl: 3    diclofenac (VOLTAREN) 50 MG EC tablet, , Disp: , Rfl:     famotidine (PEPCID) 40 MG tablet, , Disp: , Rfl:     dexlansoprazole (DEXILANT) 60 MG CPDR delayed release capsule, Per GI doctor, Disp: 30 capsule, Rfl: 5    triamcinolone (KENALOG) 0.1 % cream, Apply topically 2 times daily. , Disp: 60 g, Rfl: 3    linaclotide (LINZESS) 290 MCG CAPS capsule, Take 1 capsule by mouth every morning (before breakfast), Disp: , Rfl:     I reviewed the past medical history, allergies, medications, social history and family history. PE:   Vitals:    10/16/23 1337   BP: 136/86   Site: Left Upper Arm   Position: Sitting   Cuff Size: Large Adult   Pulse: 100   SpO2: 99%   Weight: 237 lb (107.5 kg)   Height: 5' 2\" (1.575 m)     General Appearance: ? She is non verbral. Mumbles. well developed, well nourished, obese and non verbal. no icterus  Neck:?There is no carotid bruits. The Neck is supple. Neuro:?CN 2-12 grossly intact with no focal deficits. Power 5/5 throughout, ?right wrist flexion spasticity. ? Reflexes are symmetric. Cerebellar exam is Intact. Sensory exam is intact to light touch. ?Gait is intact. No lower extremity edema.               DATA:      Results for orders

## 2023-11-02 ENCOUNTER — HOSPITAL ENCOUNTER (OUTPATIENT)
Age: 43
Discharge: HOME | End: 2023-11-02
Payer: MEDICARE

## 2023-11-02 ENCOUNTER — OFFICE VISIT (OUTPATIENT)
Dept: FAMILY MEDICINE CLINIC | Age: 43
End: 2023-11-02

## 2023-11-02 VITALS
BODY MASS INDEX: 42.95 KG/M2 | HEART RATE: 94 BPM | RESPIRATION RATE: 16 BRPM | OXYGEN SATURATION: 98 % | TEMPERATURE: 97 F | DIASTOLIC BLOOD PRESSURE: 88 MMHG | SYSTOLIC BLOOD PRESSURE: 128 MMHG | WEIGHT: 233.4 LBS | HEIGHT: 62 IN

## 2023-11-02 DIAGNOSIS — R56.9 PARTIAL SEIZURE (HCC): ICD-10-CM

## 2023-11-02 DIAGNOSIS — F79 INTELLECTUAL DISABILITY: ICD-10-CM

## 2023-11-02 DIAGNOSIS — D72.829 LEUKOCYTOSIS, UNSPECIFIED TYPE: ICD-10-CM

## 2023-11-02 DIAGNOSIS — R73.03 PREDIABETES: Primary | ICD-10-CM

## 2023-11-02 DIAGNOSIS — R21 PERINEAL RASH IN FEMALE: ICD-10-CM

## 2023-11-02 DIAGNOSIS — N92.0 MENORRHAGIA WITH REGULAR CYCLE: Chronic | ICD-10-CM

## 2023-11-02 DIAGNOSIS — Z23 NEED FOR VACCINATION: ICD-10-CM

## 2023-11-02 PROBLEM — S83.005A PATELLAR DISLOCATION, LEFT, INITIAL ENCOUNTER: Status: RESOLVED | Noted: 2018-08-10 | Resolved: 2023-11-02

## 2023-11-02 LAB — PHENYTOIN SERPL-MCNC: 5.1 UG/ML (ref 6–18)

## 2023-11-02 PROCEDURE — 80185 ASSAY OF PHENYTOIN TOTAL: CPT

## 2023-11-02 PROCEDURE — 36415 COLL VENOUS BLD VENIPUNCTURE: CPT

## 2023-11-02 RX ORDER — MEDROXYPROGESTERONE ACETATE 150 MG/ML
150 INJECTION, SUSPENSION INTRAMUSCULAR
Qty: 1 ML | Refills: 4 | Status: SHIPPED | OUTPATIENT
Start: 2023-11-02

## 2023-11-02 RX ORDER — MEDROXYPROGESTERONE ACETATE 150 MG/ML
150 INJECTION, SUSPENSION INTRAMUSCULAR ONCE
Status: COMPLETED | OUTPATIENT
Start: 2023-11-02 | End: 2023-11-02

## 2023-11-02 RX ORDER — MEDROXYPROGESTERONE ACETATE 150 MG/ML
150 INJECTION, SUSPENSION INTRAMUSCULAR ONCE
Qty: 1 ML | Refills: 3
Start: 2023-11-02 | End: 2023-11-02

## 2023-11-02 RX ADMIN — MEDROXYPROGESTERONE ACETATE 150 MG: 150 INJECTION, SUSPENSION INTRAMUSCULAR at 10:46

## 2023-11-02 ASSESSMENT — ENCOUNTER SYMPTOMS: SHORTNESS OF BREATH: 0

## 2023-11-02 NOTE — PROGRESS NOTES
Acosta Cannon (:  1980) is a 39 y.o. female,Established patient, here for evaluation of the following chief complaint(s):  Follow-up (No issues/concerns)         ASSESSMENT/PLAN:  1. Prediabetes  -     Basic Metabolic Panel; Future  -     Hemoglobin A1C; Future  2. BMI 40.0-44.9, adult [facility])  -     Basic Metabolic Panel; Future  -     Hemoglobin A1C; Future  3. Leukocytosis, unspecified type  -     Basic Metabolic Panel; Future  -     Hemoglobin A1C; Future  4. Need for vaccination  -     Influenza, FLUCELVAX, (age 10 mo+), IM, Preservative Free, 0.5 mL  5. Menorrhagia with regular cycle  -     medroxyPROGESTERone (DEPO-PROVERA) 150 MG/ML injection; Inject 150 mg into the muscle every 3 months, Disp-1 mL, R-4Normal  -     medroxyPROGESTERone (DEPO-PROVERA) injection 150 mg; 150 mg, IntraMUSCular, ONCE, 1 dose, On u 23 at 1115  6. Perineal rash in female  -     nystatin-triamcinolone (MYCOLOG II) 876263-9.1 UNIT/GM-% cream; Apply topically 2 times daily. , Disp-60 g, R-4, Normal  7. Intellectual disability    Improved metabolic situation  Continue with better food and activity environment    Consider DEXA scan due to chronic use of depoprovera  Continue cbc monitoring, peripheral smear. Hematology considered. Return in about 6 months (around 2024). Subjective   SUBJECTIVE/OBJECTIVE:  HPI    Following for weight gain, prediabetes. Patient with intellectual disability, lives in group home. Now she's in her own room instead of having roommate who ate frequently and snacked and was causing some conflict. Patient is Happier. Diet is being monitored as well. Less snacking.   -- A1c has come down. Mother is pleased     Also need depo-provera for menorrhagia, on since age 14. Images of 2019 reviewed including xray/MRI/CT    Will be having EGD due to swallowing and throat trouble. No cough, congestion     Noted high wbc -- better this time. No fever/chills. No night sweats reported.  No

## 2023-11-02 NOTE — PROGRESS NOTES
Vaccine Information Sheet, \"Influenza - Inactivated\"  given to Rodney Gregg, or parent/legal guardian of  Rodney Gregg and verbalized understanding. Patient responses:    Have you ever had a reaction to a flu vaccine? No  Do you have an allergy to eggs, neomycin or polymixin? No  Do you have an allergy to Thimerosal, contact lens solution, or Merthiolate? No  Have you ever had Guillian Barney Syndrome? No  Do you have any current illness? No  Do you have a temperature above 100 degrees? No  Are you pregnant? No  If pregnant, permission obtained from physician? No  Do you have an active neurological disorder? No      Flu vaccine given per order. Please see immunization tab.

## 2023-11-08 ENCOUNTER — TELEPHONE (OUTPATIENT)
Dept: FAMILY MEDICINE CLINIC | Age: 43
End: 2023-11-08

## 2023-11-08 DIAGNOSIS — J30.1 SEASONAL ALLERGIC RHINITIS DUE TO POLLEN: Primary | ICD-10-CM

## 2023-11-08 NOTE — TELEPHONE ENCOUNTER
Gavin Kinsey  brought in a PRN medication list that the group home needs updated list. The PRN medication list is from when Dr. Hieu Colin was here.  We have none of these medications on our current medication list.

## 2023-11-08 NOTE — TELEPHONE ENCOUNTER
If patient is using those medications, we should add them. I see that cetirizine is listed twice. What does group home need to update provider?

## 2023-11-10 RX ORDER — ALUMINA, MAGNESIA, AND SIMETHICONE 2400; 2400; 240 MG/30ML; MG/30ML; MG/30ML
30 SUSPENSION ORAL EVERY 6 HOURS PRN
Qty: 100 ML | Refills: 0
Start: 2023-11-10

## 2023-11-10 RX ORDER — ACETAMINOPHEN 500 MG
1000 TABLET ORAL EVERY 6 HOURS PRN
Qty: 100 TABLET | Refills: 0
Start: 2023-11-10

## 2023-11-10 RX ORDER — TETRAHYDROZOLINE HCL 0.05 %
2 DROPS OPHTHALMIC (EYE) AS NEEDED
Qty: 30 ML | Refills: 0
Start: 2023-11-10

## 2023-11-10 RX ORDER — IBUPROFEN 200 MG
TABLET ORAL
Qty: 28 G | Refills: 1
Start: 2023-11-10

## 2023-11-10 RX ORDER — ACETAMINOPHEN 160 MG
TABLET,DISINTEGRATING ORAL
Qty: 118 ML | Refills: 0
Start: 2023-11-10

## 2023-11-10 RX ORDER — CETIRIZINE HYDROCHLORIDE 10 MG/1
10 TABLET ORAL PRN
Qty: 100 TABLET | Refills: 0
Start: 2023-11-10

## 2023-11-10 RX ORDER — GUAIFENESIN/DEXTROMETHORPHAN 100-10MG/5
10 SYRUP ORAL 4 TIMES DAILY PRN
Qty: 118 ML | Refills: 0
Start: 2023-11-10 | End: 2023-11-20

## 2023-11-13 RX ORDER — SODIUM CHLORIDE 0.9 % (FLUSH) 0.9 %
5-40 SYRINGE (ML) INJECTION PRN
Status: DISCONTINUED | OUTPATIENT
Start: 2023-11-13 | End: 2023-11-14 | Stop reason: HOSPADM

## 2023-11-13 RX ORDER — SODIUM CHLORIDE 9 MG/ML
INJECTION, SOLUTION INTRAVENOUS CONTINUOUS
Status: DISCONTINUED | OUTPATIENT
Start: 2023-11-13 | End: 2023-11-14 | Stop reason: HOSPADM

## 2023-11-13 RX ORDER — SODIUM CHLORIDE 9 MG/ML
25 INJECTION, SOLUTION INTRAVENOUS PRN
Status: DISCONTINUED | OUTPATIENT
Start: 2023-11-13 | End: 2023-11-14 | Stop reason: HOSPADM

## 2023-11-13 RX ORDER — SODIUM CHLORIDE 0.9 % (FLUSH) 0.9 %
5-40 SYRINGE (ML) INJECTION EVERY 12 HOURS SCHEDULED
Status: DISCONTINUED | OUTPATIENT
Start: 2023-11-13 | End: 2023-11-14 | Stop reason: HOSPADM

## 2023-11-14 ENCOUNTER — ANESTHESIA (OUTPATIENT)
Dept: ENDOSCOPY | Age: 43
End: 2023-11-14
Payer: MEDICARE

## 2023-11-14 ENCOUNTER — HOSPITAL ENCOUNTER (OUTPATIENT)
Age: 43
Setting detail: OUTPATIENT SURGERY
Discharge: HOME | End: 2023-11-14
Attending: INTERNAL MEDICINE | Admitting: INTERNAL MEDICINE
Payer: MEDICARE

## 2023-11-14 ENCOUNTER — ANESTHESIA EVENT (OUTPATIENT)
Dept: ENDOSCOPY | Age: 43
End: 2023-11-14
Payer: MEDICARE

## 2023-11-14 VITALS
BODY MASS INDEX: 42.33 KG/M2 | HEIGHT: 62 IN | WEIGHT: 230 LBS | SYSTOLIC BLOOD PRESSURE: 134 MMHG | DIASTOLIC BLOOD PRESSURE: 90 MMHG | OXYGEN SATURATION: 95 % | TEMPERATURE: 97.2 F | HEART RATE: 97 BPM | RESPIRATION RATE: 16 BRPM

## 2023-11-14 PROCEDURE — 7100000010 HC PHASE II RECOVERY - FIRST 15 MIN: Performed by: INTERNAL MEDICINE

## 2023-11-14 PROCEDURE — 7100000011 HC PHASE II RECOVERY - ADDTL 15 MIN: Performed by: INTERNAL MEDICINE

## 2023-11-14 PROCEDURE — 2580000003 HC RX 258: Performed by: INTERNAL MEDICINE

## 2023-11-14 PROCEDURE — 2580000003 HC RX 258: Performed by: NURSE ANESTHETIST, CERTIFIED REGISTERED

## 2023-11-14 PROCEDURE — 3700000000 HC ANESTHESIA ATTENDED CARE: Performed by: INTERNAL MEDICINE

## 2023-11-14 PROCEDURE — 3609017700 HC EGD DILATION GASTRIC/DUODENAL STRICTURE: Performed by: INTERNAL MEDICINE

## 2023-11-14 PROCEDURE — 6360000002 HC RX W HCPCS: Performed by: NURSE ANESTHETIST, CERTIFIED REGISTERED

## 2023-11-14 RX ORDER — SODIUM CHLORIDE 9 MG/ML
INJECTION, SOLUTION INTRAVENOUS CONTINUOUS PRN
Status: DISCONTINUED | OUTPATIENT
Start: 2023-11-14 | End: 2023-11-14 | Stop reason: SDUPTHER

## 2023-11-14 RX ORDER — PROPOFOL 10 MG/ML
INJECTION, EMULSION INTRAVENOUS PRN
Status: DISCONTINUED | OUTPATIENT
Start: 2023-11-14 | End: 2023-11-14 | Stop reason: SDUPTHER

## 2023-11-14 RX ADMIN — SODIUM CHLORIDE: 9 INJECTION, SOLUTION INTRAVENOUS at 13:38

## 2023-11-14 RX ADMIN — PROPOFOL 50 MG: 10 INJECTION, EMULSION INTRAVENOUS at 13:44

## 2023-11-14 RX ADMIN — PROPOFOL 40 MG: 10 INJECTION, EMULSION INTRAVENOUS at 13:46

## 2023-11-14 RX ADMIN — SODIUM CHLORIDE: 9 INJECTION, SOLUTION INTRAVENOUS at 13:40

## 2023-11-14 RX ADMIN — PROPOFOL 30 MG: 10 INJECTION, EMULSION INTRAVENOUS at 13:48

## 2023-11-14 NOTE — PROGRESS NOTES
EGD complete,dilation complete with 54 FR american dilator. Guidewire removed spring intact photos taken, pt tolerated procedure well    Scope Number [id number] used.

## 2023-11-14 NOTE — POST SEDATION
[address]   Sedation/Analgesia Post Sedation Record    Patient: Matt Byrd : 1980  Med Rec#: [id number] Acc#: [id number]   Procedure Performed By: Noe Tim MD  Primary Care Physician: Lucas Anderson MD    POST-PROCEDURE    Physicians/Assistants: Noe Tim MD  Procedure Performed:    Sedation/Anesthesia:     Estimated Blood Loss:          ml  Specimens Removed:  [x]None []Other:      Disposition of Specimen:  []Pathology [x]Other      Complications:   [x]None Immediate []Other:     Post-procedure Diagnosis/Findings:             Recommendations:     stricture          Noe Tim MD, MD [facility]  Electronically signed 2023 at 1:54 PM

## 2023-11-14 NOTE — H&P
[address]  Sedation/Analgesia History & Physical    Patient: Polly Peterson : 1980  Med Rec#: [id number] Acc#: [id number]   Provider Performing Procedure: Cheryle Robin, MD  Primary Care Physician: Humberto Alberto MD    PRE-PROCEDURE   Full CODE [x]Yes  DNR-CCA/DNR-CC []Yes   Brief History/Pre-Procedure Diagnosis:dysphagia          MEDICAL HISTORY  []CAD/Valve  []Liver Disease  []Lung Disease []Diabetes  []Hypertension []Renal Disease  []Additional information:       has a past medical history of Autism, Moderately mentally retarded, Nausea & vomiting, Patellar dislocation, left, initial encounter, and Seizures ([address]). SURGICAL HISTORY   has a past surgical history that includes Nasal polyp surgery (); Cholecystectomy, laparoscopic (2013); Upper gastrointestinal endoscopy; and pr egd balloon dilation esophagus <30 mm diam (N/A, 10/24/2017). Additional information:       ALLERGIES   Allergies as of 2023 - Fully Reviewed 10/16/2023   Allergen Reaction Noted    Seasonal  2015     Additional information:       MEDICATIONS   Coumadin Use Last 7 Days [x]No []Yes  Antiplatelet drug therapy use last 7 days  [x]No []Yes  Other anticoagulant use last 7 days  [x]No []Yes    Current Facility-Administered Medications:     sodium chloride flush 0.9 % injection 5-40 mL, 5-40 mL, IntraVENous, 2 times per day, Cheryle Robin, MD    sodium chloride flush 0.9 % injection 5-40 mL, 5-40 mL, IntraVENous, PRN, Cheryle Robin, MD    0.9 % sodium chloride infusion, 25 mL, IntraVENous, PRN, Cheryle Robin, MD    0.9 % sodium chloride infusion, , IntraVENous, Continuous, Cheryle Robin, MD  Prior to Admission medications    Medication Sig Start Date End Date Taking?  Authorizing Provider   cetirizine (ZYRTEC) 10 MG tablet Take 1 tablet by mouth as needed for Allergies NH prn medication 11/10/23   Humberto Alberto MD   aluminum & magnesium hydroxide-simethicone ([address]) [phone]

## 2023-11-14 NOTE — PROGRESS NOTES
Xochilt Guillen admitted to endoscopy for a EGD dilation. Procedure verified and consent signed by mother.

## 2023-11-14 NOTE — ANESTHESIA PRE PROCEDURE
Date/Time    WBC 11.9 10/07/2023 09:42 AM    RBC 5.22 10/07/2023 09:42 AM    RBC 4.79 08/30/2021 02:50 PM    HGB 15.5 10/07/2023 09:42 AM    HCT 47.1 10/07/2023 09:42 AM    MCV 90.2 10/07/2023 09:42 AM    RDW 16.0 08/30/2021 02:50 PM     10/07/2023 09:42 AM       CMP:   Lab Results   Component Value Date/Time     10/07/2023 09:42 AM    K 4.4 10/07/2023 09:42 AM     10/07/2023 09:42 AM    CO2 24 10/07/2023 09:42 AM    BUN 7 10/07/2023 09:42 AM    CREATININE 0.6 10/07/2023 09:42 AM    LABGLOM >60 10/07/2023 09:42 AM    GLUCOSE 114 10/07/2023 09:42 AM    GLUCOSE 101 08/30/2021 02:50 PM    PROT 7.0 10/07/2023 09:42 AM    CALCIUM 9.1 10/07/2023 09:42 AM    BILITOT 0.2 10/07/2023 09:42 AM    ALKPHOS 158 10/07/2023 09:42 AM    AST 13 10/07/2023 09:42 AM    ALT 16 10/07/2023 09:42 AM       POC Tests: No results for input(s): \"POCGLU\", \"POCNA\", \"POCK\", \"POCCL\", \"POCBUN\", \"POCHEMO\", \"POCHCT\" in the last 72 hours. Coags: No results found for: \"PROTIME\", \"INR\", \"APTT\"    HCG (If Applicable):   Lab Results   Component Value Date    PREGSERUM NEGATIVE 04/05/2013        ABGs: No results found for: \"PHART\", \"PO2ART\", \"JAX2QVK\", \"MAE6ROU\", \"BEART\", \"C4QNNRAI\"     Type & Screen (If Applicable):  No results found for: \"LABABO\", \"LABRH\"    Anesthesia Evaluation  Patient summary reviewed  Airway: Mallampati: IV  TM distance: >3 FB   Neck ROM: full  Mouth opening: < 3 FB   Dental:          Pulmonary:                              Cardiovascular:          ECG reviewed  Rhythm: regular             Beta Blocker:  Not on Beta Blocker         Neuro/Psych:   (+) seizures: well controlled, psychiatric history: stable with treatment             ROS comment: autism GI/Hepatic/Renal:             Endo/Other:                      ROS comment: prediabetes Abdominal:             Vascular: Other Findings:             Anesthesia Plan      MAC     ASA 2       Induction: intravenous.       Anesthetic plan and risks discussed

## 2023-11-14 NOTE — ANESTHESIA POSTPROCEDURE EVALUATION
Department of Anesthesiology  Postprocedure Note    Patient: Lincoln Mcardle  MRN: [id number]  YOB: 1980  Date of evaluation: 11/14/2023      Procedure Summary     Date: 11/14/23 Room / Location: [address] [place], [place] [address] / [address] [place] [address]    Anesthesia Start: 9063 Anesthesia Stop: 6289    Procedure: EGD DILATATION (Esophagus) Diagnosis:       Dysphagia, unspecified type      (Dysphagia, unspecified type [R13.10])    Surgeons: Ginger Lagos MD Responsible Provider: Mary Lou Chan MD    Anesthesia Type: MAC ASA Status: 2          Anesthesia Type: No value filed.     Grace Phase I: Grace Score: 10    Grace Phase II:        Anesthesia Post Evaluation    Patient location during evaluation: bedside  Patient participation: complete - patient cannot participate  Level of consciousness: awake and alert  Pain score: 0  Airway patency: patent  Nausea & Vomiting: no vomiting and no nausea  Complications: no  Cardiovascular status: blood pressure returned to baseline and hemodynamically stable  Respiratory status: acceptable, nasal cannula, nonlabored ventilation and spontaneous ventilation  Hydration status: stable

## 2023-11-15 NOTE — OP NOTE
[place] [address]                                OPERATIVE REPORT    PATIENT NAME: Nelia Velarde                    :        1980  MED REC NO:   [id number]                           ROOM:  ACCOUNT NO:   [de-identified]                           ADMIT DATE: 2023  PROVIDER:     BORIS Del Rosario OF PROCEDURE:  2023    OPERATION PERFORMED:  EGD plus dilation. SURGEON:  Ailyn Yusuf MD    INDICATION FOR THE PROCEDURE:  The patient with a history of  intermittent dysphagia. The patient has been dilated before. The  patient is mentally challenged, and most of the history is from the  mother. See the preop note for rest of clinicals. ASA CLASSIFICATION:  II.    MEDICATION:  Per Anesthesia. BIOPSY:  None. PHOTOGRAPH:  Yes. ESTIMATED BLOOD LOSS:  Minimal.    DESCRIPTION OF PROCEDURE:  Informed consent was obtained after  explaining risks and benefits of the procedure and conscious sedation. Possible complications including bleeding, perforation, reaction to  medicine, but not limiting to death, were discussed. Afterwards, the GIF-180 gastroscope was advanced through oropharynx,  esophagus, stomach, into the duodenum. Normal-looking duodenal bulb and  postbulbar area. See photographs. Scope was withdrawn. The patient  was having antral gastritis extending in the body. Fundus and cardia  were relatively spared. Small hiatal hernia. On top of hernia, the  scarring was appreciated. Endoscopically, there was no esophagitis. Scope was advanced back in the antrum, and guidewire was left behind. A  54 dilator was advanced. The patient tolerated the procedure well. IMPRESSION:  1. Esophageal stricture post dilation. 2.  Long-standing GERD. RECOMMENDATIONS:  Followup office visit with Cristel Harmon CNP, in six  months.   Periodic dilation based on the patient's needs, and PPI

## 2024-01-24 ENCOUNTER — NURSE ONLY (OUTPATIENT)
Dept: FAMILY MEDICINE CLINIC | Age: 44
End: 2024-01-24

## 2024-01-24 DIAGNOSIS — N92.0 MENORRHAGIA WITH REGULAR CYCLE: Primary | ICD-10-CM

## 2024-01-24 RX ORDER — MEDROXYPROGESTERONE ACETATE 150 MG/ML
150 INJECTION, SUSPENSION INTRAMUSCULAR ONCE
Status: COMPLETED | OUTPATIENT
Start: 2024-01-24 | End: 2024-01-24

## 2024-01-24 RX ADMIN — MEDROXYPROGESTERONE ACETATE 150 MG: 150 INJECTION, SUSPENSION INTRAMUSCULAR at 15:42

## 2024-02-06 DIAGNOSIS — R56.9 PARTIAL SEIZURE (HCC): ICD-10-CM

## 2024-02-07 RX ORDER — PHENYTOIN SODIUM 100 MG/1
CAPSULE, EXTENDED RELEASE ORAL
Qty: 90 CAPSULE | Refills: 6 | Status: SHIPPED | OUTPATIENT
Start: 2024-02-07

## 2024-02-07 NOTE — TELEPHONE ENCOUNTER
Please approve or deny     Last Visit Date:  10/16/2023        Next Visit Date:    10/18/2024 Paige Leopold

## 2024-04-03 NOTE — TELEPHONE ENCOUNTER
Encounter Date: 7/13/2022       History     Chief Complaint   Patient presents with    Insect Bite     C/o possible insect bit to the abdomen. Some swelling and redness noted.      1-year-old well-appearing male presents emergency department with his mother who reports she noticed a small reddened area to the child's abdomen earlier today she is concerned about a possible insect bite she denies child having any associated fevers does not appear to be scratching the area.  Immunizations are up-to-date        Review of patient's allergies indicates:  No Known Allergies  No past medical history on file.  Past Surgical History:   Procedure Laterality Date    CIRCUMCISION       Family History   Problem Relation Age of Onset    Asthma Mother     Crohn's disease Father     No Known Problems Maternal Grandmother     No Known Problems Maternal Grandfather     No Known Problems Paternal Grandmother     No Known Problems Paternal Grandfather      Social History     Tobacco Use    Smoking status: Never Smoker    Smokeless tobacco: Never Used     Review of Systems   Constitutional: Negative for fever.   HENT: Negative.    Respiratory: Negative.    Cardiovascular: Negative.    Gastrointestinal: Negative.    Genitourinary: Negative.    Musculoskeletal: Negative.    Skin: Positive for wound.   Neurological: Negative.    Hematological: Negative.    Psychiatric/Behavioral: Negative.    All other systems reviewed and are negative.      Physical Exam     Initial Vitals [07/13/22 1559]   BP Pulse Resp Temp SpO2   (!) 86/56 117 22 99 °F (37.2 °C) 100 %      MAP       --         Physical Exam    Nursing note and vitals reviewed.  Constitutional: He appears well-developed and well-nourished.   HENT:   Mouth/Throat: Mucous membranes are moist.   Abdominal: Abdomen is soft.     Neurological: He is alert.   Skin:   Very small erythematous area that is very localized with 1 white pustule suggestive of insect bite no surrounding 
Trish Pina called requesting a refill on the following medications:  Requested Prescriptions     Pending Prescriptions Disp Refills    diclofenac (VOLTAREN) 50 MG EC tablet 60 tablet 0     Sig: Take 1 tablet by mouth daily       Date of last visit: 11/2/2023  Date of next visit (if applicable):5/2/2024  Date of last refill:   Pharmacy Name:[facility],  Shilpa Brenner MA        
cellulitis noted or abscess         ED Course   Procedures  Labs Reviewed - No data to display       Imaging Results    None          Medications - No data to display  Medical Decision Making:   Initial Assessment:   1-year-old well-appearing male presents emergency department with his mother who reports she noticed a small reddened area to the child's abdomen earlier today she is concerned about a possible insect bite she denies child having any associated fevers does not appear to be scratching the area.  Immunizations are up-to-date        Differential Diagnosis:   Cellulitis, abscess, insect sting  ED Management:  1-year-old well-appearing male presents emergency department with small localized erythematous area show consistent with insect bite.  No evidence of abscess or cellulitis patient will be discharged home with mother instructed on Bactroban ointment application follow-up with PCP given return precautions            Attending Attestation:   Physician Attestation Statement for Resident:  As the supervising MD   Physician Attestation Statement: I have personally seen and examined this patient.   I agree with the above history. -: Patient presents with somewhat and exertional dyspnea on exertion and chest pain.  No history of obstructive   As the supervising MD I agree with the above PE.   -: Patient alert, lungs clear to auscultation, cardiovascular regular rate rhythm   As the supervising MD I agree with the above treatment, course, plan, and disposition.   -: Patient presents with exertional chest pain.  Multiple risk factors.  Troponin currently unremarkable.  EKG normal sinus rhythm with no ST elevation.  Hospitalist consulted for serial enzymes and possible stress  I have reviewed and agree with the residents interpretation of the following: lab data, x-rays, EKG and rhythm strips.  I have reviewed the following: old records at this facility.                         Clinical Impression:   Final 
diagnoses:  [S30.861A, W57.XXXA] Insect bite of abdominal wall, initial encounter (Primary)          ED Disposition Condition    Discharge Stable        ED Prescriptions     Medication Sig Dispense Start Date End Date Auth. Provider    mupirocin (BACTROBAN) 2 % ointment Apply topically 2 (two) times daily. for 10 days 1 each 7/13/2022 7/23/2022 KENYON Gonzales        Follow-up Information     Follow up With Specialties Details Why Contact Info    [facility]  Schedule an appointment as soon as possible for a visit in 2 days  [address] [facility] [address]  [phone]             KENYON Gonzales  07/13/22 1610       Jovan Freire MD  07/13/22 1818    
Add 68338 Cpt? (Important Note: In 2017 The Use Of 26049 Is Being Tracked By Cms To Determine Future Global Period Reimbursement For Global Periods): yes
Detail Level: Detailed

## 2024-04-24 ENCOUNTER — NURSE ONLY (OUTPATIENT)
Dept: FAMILY MEDICINE CLINIC | Age: 44
End: 2024-04-24
Payer: MEDICARE

## 2024-04-24 DIAGNOSIS — N92.0 MENORRHAGIA WITH REGULAR CYCLE: Primary | Chronic | ICD-10-CM

## 2024-04-24 PROCEDURE — 96372 THER/PROPH/DIAG INJ SC/IM: CPT | Performed by: FAMILY MEDICINE

## 2024-04-24 RX ORDER — MEDROXYPROGESTERONE ACETATE 150 MG/ML
150 INJECTION, SUSPENSION INTRAMUSCULAR ONCE
Status: COMPLETED | OUTPATIENT
Start: 2024-04-24 | End: 2024-04-24

## 2024-04-24 RX ADMIN — MEDROXYPROGESTERONE ACETATE 150 MG: 150 INJECTION, SUSPENSION INTRAMUSCULAR at 15:36

## 2024-04-24 NOTE — PROGRESS NOTES
Patient here for depo-provera injection  Last injection 1/24/24  Given IM right dorsogluteal   Patient tolerated well     Dual checked by Shilpa Brenner [facility]

## 2024-05-02 ENCOUNTER — OFFICE VISIT (OUTPATIENT)
Dept: FAMILY MEDICINE CLINIC | Age: 44
End: 2024-05-02

## 2024-05-02 ENCOUNTER — HOSPITAL ENCOUNTER (OUTPATIENT)
Age: 44
Discharge: HOME | End: 2024-05-02
Payer: MEDICARE

## 2024-05-02 VITALS
BODY MASS INDEX: 42.69 KG/M2 | SYSTOLIC BLOOD PRESSURE: 128 MMHG | WEIGHT: 232 LBS | OXYGEN SATURATION: 100 % | HEART RATE: 97 BPM | RESPIRATION RATE: 16 BRPM | DIASTOLIC BLOOD PRESSURE: 70 MMHG | HEIGHT: 62 IN | TEMPERATURE: 97.1 F

## 2024-05-02 DIAGNOSIS — F79 INTELLECTUAL DISABILITY: Primary | ICD-10-CM

## 2024-05-02 DIAGNOSIS — R56.9 PARTIAL SEIZURE (HCC): ICD-10-CM

## 2024-05-02 DIAGNOSIS — D72.829 LEUKOCYTOSIS, UNSPECIFIED TYPE: ICD-10-CM

## 2024-05-02 DIAGNOSIS — E66.01 OBESITY, CLASS III, BMI 40-49.9 (MORBID OBESITY) (HCC): ICD-10-CM

## 2024-05-02 DIAGNOSIS — R73.03 PREDIABETES: ICD-10-CM

## 2024-05-02 DIAGNOSIS — M79.661 PAIN IN RIGHT LOWER LEG: ICD-10-CM

## 2024-05-02 DIAGNOSIS — G81.11 RIGHT SPASTIC HEMIPARESIS (HCC): ICD-10-CM

## 2024-05-02 DIAGNOSIS — N92.0 MENORRHAGIA WITH REGULAR CYCLE: Chronic | ICD-10-CM

## 2024-05-02 DIAGNOSIS — R39.81 URINARY INCONTINENCE DUE TO COGNITIVE IMPAIRMENT: ICD-10-CM

## 2024-05-02 DIAGNOSIS — N39.42 URINARY INCONTINENCE WITHOUT SENSORY AWARENESS: ICD-10-CM

## 2024-05-02 LAB
ANION GAP SERPL CALC-SCNC: 14 MEQ/L (ref 8–16)
BUN SERPL-MCNC: 13 MG/DL (ref 7–22)
CALCIUM SERPL-MCNC: 9.5 MG/DL (ref 8.5–10.5)
CHLORIDE SERPL-SCNC: 105 MEQ/L (ref 98–111)
CO2 SERPL-SCNC: 22 MEQ/L (ref 23–33)
CREAT SERPL-MCNC: 0.6 MG/DL (ref 0.4–1.2)
DEPRECATED MEAN GLUCOSE BLD GHB EST-ACNC: 108 MG/DL (ref 70–126)
GFR SERPL CREATININE-BSD FRML MDRD: > 90 ML/MIN/1.73M2
GLUCOSE SERPL-MCNC: 91 MG/DL (ref 70–108)
HBA1C MFR BLD HPLC: 5.6 % (ref 4.4–6.4)
POTASSIUM SERPL-SCNC: 4.5 MEQ/L (ref 3.5–5.2)
SODIUM SERPL-SCNC: 141 MEQ/L (ref 135–145)

## 2024-05-02 PROCEDURE — 80048 BASIC METABOLIC PNL TOTAL CA: CPT

## 2024-05-02 PROCEDURE — 83036 HEMOGLOBIN GLYCOSYLATED A1C: CPT

## 2024-05-02 PROCEDURE — 36415 COLL VENOUS BLD VENIPUNCTURE: CPT

## 2024-05-02 SDOH — ECONOMIC STABILITY: FOOD INSECURITY: WITHIN THE PAST 12 MONTHS, YOU WORRIED THAT YOUR FOOD WOULD RUN OUT BEFORE YOU GOT MONEY TO BUY MORE.: NEVER TRUE

## 2024-05-02 SDOH — ECONOMIC STABILITY: HOUSING INSECURITY
IN THE LAST 12 MONTHS, WAS THERE A TIME WHEN YOU DID NOT HAVE A STEADY PLACE TO SLEEP OR SLEPT IN A SHELTER (INCLUDING NOW)?: NO

## 2024-05-02 SDOH — ECONOMIC STABILITY: INCOME INSECURITY: HOW HARD IS IT FOR YOU TO PAY FOR THE VERY BASICS LIKE FOOD, HOUSING, MEDICAL CARE, AND HEATING?: NOT HARD AT ALL

## 2024-05-02 SDOH — ECONOMIC STABILITY: FOOD INSECURITY: WITHIN THE PAST 12 MONTHS, THE FOOD YOU BOUGHT JUST DIDN'T LAST AND YOU DIDN'T HAVE MONEY TO GET MORE.: NEVER TRUE

## 2024-05-02 ASSESSMENT — PATIENT HEALTH QUESTIONNAIRE - PHQ9
1. LITTLE INTEREST OR PLEASURE IN DOING THINGS: NOT AT ALL
SUM OF ALL RESPONSES TO PHQ QUESTIONS 1-9: 0
SUM OF ALL RESPONSES TO PHQ9 QUESTIONS 1 & 2: 0
2. FEELING DOWN, DEPRESSED OR HOPELESS: NOT AT ALL

## 2024-05-02 NOTE — PROGRESS NOTES
Trish Pina (:  1980) is a 43 y.o. female,Established patient, here for evaluation of the following chief complaint(s):  Diabetes (No Concerns )      Assessment & Plan   ASSESSMENT/PLAN:  1. Intellectual disability  2. Obesity, Class III, BMI 40-49.9 (morbid obesity) ([facility])  3. Right spastic hemiparesis ([facility])  4. Partial seizure ([facility])  5. Urinary incontinence due to cognitive impairment  6. Urinary incontinence without sensory awareness  7. Prediabetes  8. BMI 40.0-44.9, adult ([facility])  9. Menorrhagia with regular cycle -- control with DepoProvera   10. Pain in right lower leg    A1c and bmp not done -- obtain today  Appears improved diet and activity.  Continued monitoring and optimizing diet and exercise with current circumstances.  Stable otherwise.   Discussed pro's and con's again of deproprovera.  Mother who is POA prefers keeping treatment until 49 yo. Continue calcium and vitamin d.  Noted walking in hallway, appears to be at baseline for walking. Muscle strain more likely.    Return in about 6 months (around 2024) for awv.         Subjective   SUBJECTIVE/OBJECTIVE:  HPI  Patient with intellectual disability lives in a group home but closely monitored by mother.   Problem noticed by mom for a month in Right lower leg, had a knot in calf muscle that mom massaged but it remains tender.  No bruising or swelling noted.  She has no discoloration or swelling in leg.    No reports of falls or injuries .  Hip bursitis helped significantly by laser therapy.   Hesitates to step up with foot or to   Eventually she puts the foot flat when walking, but doesn't do so when sittin.   A1c and bmp ordered for this visit but not done.  They will do today.   Depoprovera. Menorrhagia treatment. Bone health reviewed. More active now. Takes calcium + vitamin D.   Recently healthy bone if jaw per dentist. No cavities     Wt Readings from Last 3 Encounters:   24 105.2 kg (232 lb)   23 104.3 kg (230 lb)  EXAM DESCRIPTION:  CT - Neck Angio - 12/4/2022 11:07 am

 

CLINICAL HISTORY:  Neuro deficit, acute, stroke suspected

 

TECHNIQUE:  During dynamic enhancement using nonionic IV contrast, axial 2 mm thick images of the nec
k were obtained. Sagittal and axial reconstruction images were generated using MIP technique and revi
ewed.

 

All CT scans are performed using dose optimization technique as appropriate and may include automated
 exposure control or mA/KV adjustment according to patient size.

 

COMPARISON:   CT head same date, CT angio head same date

 

FINDINGS:   No aneurysm or vascular malformation identified. No carotid or vertebral dissection.

 

No aortic arch or great vessel origin abnormality seen. Vertebral artery origins unremarkable as well
. No stenosis, vasculitis or other significant carotid artery finding. Minimal atherosclerotic change
s present at each carotid bulb without luminal narrowing. No focal abnormality of either vertebral ar
dorene. Basilar artery is normal.

 

 

IMPRESSION:   Negative CT angio neck examination.

## 2024-05-03 NOTE — RESULT ENCOUNTER NOTE
Please let mother of patient know that labs are overall reassuring.  A1c is in normal range; no diabetes.  Thank you.

## 2024-05-03 NOTE — TELEPHONE ENCOUNTER
Trish Pina called requesting a refill on the following medications:  Requested Prescriptions     Pending Prescriptions Disp Refills    diclofenac (VOLTAREN) 50 MG EC tablet [Pharmacy Med Name: DICLOFENAC SODIUM DR 50MG DR TABLET DR] 60 tablet 0     Sig: TAKE ONE TABLET DAILY, BY MOUTH.       Date of last visit: 5/2/2024  Date of next visit (if applicable):Visit date not found  Date of last refill: 4/3/24  Pharmacy Name: [facility]      Radha Garcia MA

## 2024-05-04 ASSESSMENT — ENCOUNTER SYMPTOMS: SHORTNESS OF BREATH: 0

## 2024-05-07 ENCOUNTER — TELEPHONE (OUTPATIENT)
Dept: FAMILY MEDICINE CLINIC | Age: 44
End: 2024-05-07

## 2024-05-07 NOTE — TELEPHONE ENCOUNTER
----- Message from Patrizia Diaz MD sent at 5/3/2024  4:34 PM EDT -----  Please let mother of patient know that labs are overall reassuring.  A1c is in normal range; no diabetes.  Thank you.

## 2024-05-21 DIAGNOSIS — R21 PERINEAL RASH IN FEMALE: Primary | ICD-10-CM

## 2024-05-21 RX ORDER — TRIAMCINOLONE ACETONIDE 1 MG/G
OINTMENT TOPICAL 2 TIMES DAILY
Qty: 60 G | Refills: 2 | Status: SHIPPED | OUTPATIENT
Start: 2024-05-21 | End: 2024-05-28

## 2024-05-21 RX ORDER — NYSTATIN 100000 U/G
CREAM TOPICAL
Qty: 60 G | Refills: 2 | Status: SHIPPED | OUTPATIENT
Start: 2024-05-21

## 2024-05-21 NOTE — TELEPHONE ENCOUNTER
Kwasi, pharmacist @ [facility] insurance will not cover nystatin/triamcinolone cream  Requesting medications be sent individually

## 2024-06-12 ENCOUNTER — TELEPHONE (OUTPATIENT)
Dept: FAMILY MEDICINE CLINIC | Age: 44
End: 2024-06-12

## 2024-06-12 DIAGNOSIS — G81.11 RIGHT SPASTIC HEMIPARESIS (HCC): Primary | ICD-10-CM

## 2024-06-12 DIAGNOSIS — F84.0 AUTISM: Chronic | ICD-10-CM

## 2024-06-12 DIAGNOSIS — F79 INTELLECTUAL DISABILITY: ICD-10-CM

## 2024-06-12 NOTE — TELEPHONE ENCOUNTER
Patient mother is requesting a new prescription for a handicap placard. She is actually wanting to know if there is any way they can get a permanent one. She can  tomorrow.

## 2024-06-13 NOTE — TELEPHONE ENCOUNTER
Called patient to inform her that the handicap placcard is ready. Left detailed message to  at the office and to call with any questions.

## 2024-07-17 ENCOUNTER — NURSE ONLY (OUTPATIENT)
Dept: FAMILY MEDICINE CLINIC | Age: 44
End: 2024-07-17
Payer: MEDICARE

## 2024-07-17 DIAGNOSIS — N92.0 MENORRHAGIA WITH REGULAR CYCLE: Primary | ICD-10-CM

## 2024-07-17 PROCEDURE — 96372 THER/PROPH/DIAG INJ SC/IM: CPT | Performed by: FAMILY MEDICINE

## 2024-07-17 RX ORDER — MEDROXYPROGESTERONE ACETATE 150 MG/ML
150 INJECTION, SUSPENSION INTRAMUSCULAR ONCE
Status: COMPLETED | OUTPATIENT
Start: 2024-07-17 | End: 2024-07-17

## 2024-07-17 RX ADMIN — MEDROXYPROGESTERONE ACETATE 150 MG: 150 INJECTION, SUSPENSION INTRAMUSCULAR at 15:41

## 2024-07-17 NOTE — PROGRESS NOTES
Patient here for depo-provera injection  Last injection given 4/24/24    Depo-provera 150mg/ml 1ml given IM right dorsogluteus   Patient tolerated well

## 2024-09-16 DIAGNOSIS — R56.9 PARTIAL SEIZURE (HCC): ICD-10-CM

## 2024-09-16 RX ORDER — PHENYTOIN SODIUM 100 MG/1
CAPSULE, EXTENDED RELEASE ORAL
Qty: 90 CAPSULE | Refills: 6 | Status: SHIPPED | OUTPATIENT
Start: 2024-09-16

## 2024-10-09 ENCOUNTER — TELEPHONE (OUTPATIENT)
Dept: FAMILY MEDICINE CLINIC | Age: 44
End: 2024-10-09

## 2024-10-09 NOTE — TELEPHONE ENCOUNTER
Received a VM in regards to patients depo shot.  stated they were calling on behalf of Trish and needed to schedule a Depo shot on 10/4 around 1pm. Left a phone number of [phone] to call back. We are past the date of 10/4.     Patient last Depo was on 7/17. She is able to get her Depo from 10/2-10/16.    Phone number is sister Elaine.     Called Elaine back and left  for her to call the office back.

## 2024-10-14 ENCOUNTER — NURSE ONLY (OUTPATIENT)
Dept: FAMILY MEDICINE CLINIC | Age: 44
End: 2024-10-14
Payer: MEDICARE

## 2024-10-14 DIAGNOSIS — N92.0 MENORRHAGIA WITH REGULAR CYCLE: Primary | ICD-10-CM

## 2024-10-14 PROCEDURE — 96372 THER/PROPH/DIAG INJ SC/IM: CPT | Performed by: FAMILY MEDICINE

## 2024-10-14 RX ORDER — MEDROXYPROGESTERONE ACETATE 150 MG/ML
150 INJECTION, SUSPENSION INTRAMUSCULAR ONCE
Status: COMPLETED | OUTPATIENT
Start: 2024-10-14 | End: 2024-10-14

## 2024-10-14 RX ADMIN — MEDROXYPROGESTERONE ACETATE 150 MG: 150 INJECTION, SUSPENSION INTRAMUSCULAR at 13:19

## 2024-10-14 NOTE — PROGRESS NOTES
Patient here for depo-provera injection  Last injection given 7/17/24    Depo-Provera 150mg given IM right dorsogluteal   Patient tolerated well

## 2024-10-18 ENCOUNTER — OFFICE VISIT (OUTPATIENT)
Dept: NEUROLOGY | Age: 44
End: 2024-10-18
Payer: MEDICARE

## 2024-10-18 ENCOUNTER — HOSPITAL ENCOUNTER (OUTPATIENT)
Age: 44
Discharge: HOME | End: 2024-10-18
Payer: MEDICARE

## 2024-10-18 VITALS
OXYGEN SATURATION: 96 % | HEART RATE: 122 BPM | BODY MASS INDEX: 44.16 KG/M2 | SYSTOLIC BLOOD PRESSURE: 122 MMHG | HEIGHT: 62 IN | WEIGHT: 240 LBS | DIASTOLIC BLOOD PRESSURE: 76 MMHG

## 2024-10-18 DIAGNOSIS — R56.9 PARTIAL SEIZURE (HCC): ICD-10-CM

## 2024-10-18 DIAGNOSIS — R56.9 PARTIAL SEIZURE (HCC): Primary | ICD-10-CM

## 2024-10-18 LAB
ALBUMIN SERPL BCG-MCNC: 4.5 G/DL (ref 3.5–5.1)
ALP SERPL-CCNC: 160 U/L (ref 38–126)
ALT SERPL W/O P-5'-P-CCNC: 11 U/L (ref 11–66)
ANION GAP SERPL CALC-SCNC: 13 MEQ/L (ref 8–16)
AST SERPL-CCNC: 11 U/L (ref 5–40)
BASOPHILS ABSOLUTE: 0.1 THOU/MM3 (ref 0–0.1)
BASOPHILS NFR BLD AUTO: 0.6 %
BILIRUB SERPL-MCNC: < 0.2 MG/DL (ref 0.3–1.2)
BUN SERPL-MCNC: 11 MG/DL (ref 7–22)
CALCIUM SERPL-MCNC: 9.2 MG/DL (ref 8.5–10.5)
CHLORIDE SERPL-SCNC: 103 MEQ/L (ref 98–111)
CO2 SERPL-SCNC: 25 MEQ/L (ref 23–33)
CREAT SERPL-MCNC: 0.6 MG/DL (ref 0.4–1.2)
DEPRECATED RDW RBC AUTO: 41.9 FL (ref 35–45)
EOSINOPHIL NFR BLD AUTO: 4.8 %
EOSINOPHILS ABSOLUTE: 0.5 THOU/MM3 (ref 0–0.4)
ERYTHROCYTE [DISTWIDTH] IN BLOOD BY AUTOMATED COUNT: 13.3 % (ref 11.5–14.5)
GFR SERPL CREATININE-BSD FRML MDRD: > 90 ML/MIN/1.73M2
GLUCOSE SERPL-MCNC: 102 MG/DL (ref 70–108)
HCT VFR BLD AUTO: 46.8 % (ref 37–47)
HGB BLD-MCNC: 15.3 GM/DL (ref 12–16)
IMM GRANULOCYTES # BLD AUTO: 0.04 THOU/MM3 (ref 0–0.07)
IMM GRANULOCYTES NFR BLD AUTO: 0.4 %
LYMPHOCYTES ABSOLUTE: 1.9 THOU/MM3 (ref 1–4.8)
LYMPHOCYTES NFR BLD AUTO: 18.2 %
MCH RBC QN AUTO: 28.4 PG (ref 26–33)
MCHC RBC AUTO-ENTMCNC: 32.7 GM/DL (ref 32.2–35.5)
MCV RBC AUTO: 86.8 FL (ref 81–99)
MONOCYTES ABSOLUTE: 0.9 THOU/MM3 (ref 0.4–1.3)
MONOCYTES NFR BLD AUTO: 8.2 %
NEUTROPHILS ABSOLUTE: 7.1 THOU/MM3 (ref 1.8–7.7)
NEUTROPHILS NFR BLD AUTO: 67.8 %
NRBC BLD AUTO-RTO: 0 /100 WBC
PHENYTOIN SERPL-MCNC: 4.3 UG/ML (ref 6–18)
PLATELET # BLD AUTO: 432 THOU/MM3 (ref 130–400)
PMV BLD AUTO: 9.9 FL (ref 9.4–12.4)
POTASSIUM SERPL-SCNC: 4.8 MEQ/L (ref 3.5–5.2)
PROT SERPL-MCNC: 7.3 G/DL (ref 6.1–8)
RBC # BLD AUTO: 5.39 MILL/MM3 (ref 4.2–5.4)
SODIUM SERPL-SCNC: 141 MEQ/L (ref 135–145)
WBC # BLD AUTO: 10.4 THOU/MM3 (ref 4.8–10.8)

## 2024-10-18 PROCEDURE — 99213 OFFICE O/P EST LOW 20 MIN: CPT | Performed by: NURSE PRACTITIONER

## 2024-10-18 PROCEDURE — 80053 COMPREHEN METABOLIC PANEL: CPT

## 2024-10-18 PROCEDURE — 1036F TOBACCO NON-USER: CPT | Performed by: NURSE PRACTITIONER

## 2024-10-18 PROCEDURE — 80185 ASSAY OF PHENYTOIN TOTAL: CPT

## 2024-10-18 PROCEDURE — 85025 COMPLETE CBC W/AUTO DIFF WBC: CPT

## 2024-10-18 PROCEDURE — 36415 COLL VENOUS BLD VENIPUNCTURE: CPT

## 2024-10-18 PROCEDURE — G8417 CALC BMI ABV UP PARAM F/U: HCPCS | Performed by: NURSE PRACTITIONER

## 2024-10-18 PROCEDURE — G8427 DOCREV CUR MEDS BY ELIG CLIN: HCPCS | Performed by: NURSE PRACTITIONER

## 2024-10-18 PROCEDURE — G8484 FLU IMMUNIZE NO ADMIN: HCPCS | Performed by: NURSE PRACTITIONER

## 2024-10-18 RX ORDER — PHENYTOIN SODIUM 100 MG/1
CAPSULE, EXTENDED RELEASE ORAL
Qty: 90 CAPSULE | Refills: 11 | Status: SHIPPED | OUTPATIENT
Start: 2024-10-18

## 2024-10-18 NOTE — PROGRESS NOTES
TWO TIMES A DAY, BY MOUTH. Calcium 600 mg), Disp: 180 tablet, Rfl: 3    famotidine (PEPCID) 40 MG tablet, , Disp: , Rfl:     dexlansoprazole (DEXILANT) 60 MG CPDR delayed release capsule, Per GI doctor, Disp: 30 capsule, Rfl: 5    linaclotide (LINZESS) 290 MCG CAPS capsule, Take 1 capsule by mouth every morning (before breakfast), Disp: , Rfl:     I reviewed the past medical history, allergies, medications, social history and family history.       PE:   Vitals:    10/18/24 1236   BP: 122/76   Site: Left Upper Arm   Position: Sitting   Cuff Size: Large Adult   Pulse: (!) 122   SpO2: 96%   Weight: 108.9 kg (240 lb)   Height: 1.575 m (5' 2\")          General Appearance: ?She is non verbral. Mumbles. well developed, well nourished, obese and non verbal. no icterus  Neck:?There is no carotid bruits. The Neck is supple.  Neuro:?CN 2-12 grossly intact with no focal deficits. Power 5/5 throughout, ?right wrist flexion spasticity. ?Reflexes are symmetric. . Sensory exam is intact to light touch. Gait is intact.  No lower extremity edema.       DATA:         Results for orders placed or performed during the hospital encounter of 05/02/24   Hemoglobin A1C   Result Value Ref Range    Hemoglobin A1C 5.6 4.4 - 6.4 %    Estimated Avg Glucose 108 70 - 126 mg/dL   Basic Metabolic Panel   Result Value Ref Range    Sodium 141 135 - 145 meq/L    Potassium 4.5 3.5 - 5.2 meq/L    Chloride 105 98 - 111 meq/L    CO2 22 (L) 23 - 33 meq/L    Glucose 91 70 - 108 mg/dL    BUN 13 7 - 22 mg/dL    Creatinine 0.6 0.4 - 1.2 mg/dL    Calcium 9.5 8.5 - 10.5 mg/dL   Anion Gap   Result Value Ref Range    Anion Gap 14.0 8.0 - 16.0 meq/L   Glomerular Filtration Rate, Estimated   Result Value Ref Range    Est, Glom Filt Rate > 90 >60 ml/min/1.73m2                Assessment:     Diagnosis Orders   1. Partial seizure (HCC)  Phenytoin Level, Total    CBC with Auto Differential    Comprehensive Metabolic Panel           She is doing well. No reported spells

## 2024-10-18 NOTE — PATIENT INSTRUCTIONS
Continue Dilantin. Refills given.   Dilantin level.   CBC, CMP  Continue calcium and vitamin D.   Report any new events. Call if any questions or concerns.   Follow up in 12 months or sooner if needed.

## 2024-11-06 ENCOUNTER — OFFICE VISIT (OUTPATIENT)
Dept: FAMILY MEDICINE CLINIC | Age: 44
End: 2024-11-06

## 2024-11-06 VITALS
BODY MASS INDEX: 42.65 KG/M2 | OXYGEN SATURATION: 97 % | HEART RATE: 81 BPM | DIASTOLIC BLOOD PRESSURE: 78 MMHG | WEIGHT: 231.8 LBS | RESPIRATION RATE: 20 BRPM | SYSTOLIC BLOOD PRESSURE: 134 MMHG | HEIGHT: 62 IN

## 2024-11-06 DIAGNOSIS — R73.03 PREDIABETES: ICD-10-CM

## 2024-11-06 DIAGNOSIS — N92.0 MENORRHAGIA WITH REGULAR CYCLE: Chronic | ICD-10-CM

## 2024-11-06 DIAGNOSIS — Z79.3 LONG TERM (CURRENT) USE OF HORMONAL CONTRACEPTIVES: ICD-10-CM

## 2024-11-06 DIAGNOSIS — M62.838 MUSCLE SPASM: ICD-10-CM

## 2024-11-06 DIAGNOSIS — M25.559 HIP PAIN, UNSPECIFIED LATERALITY: ICD-10-CM

## 2024-11-06 DIAGNOSIS — Z91.89 AT HIGH RISK FOR OSTEOPOROSIS: ICD-10-CM

## 2024-11-06 DIAGNOSIS — Z13.6 ENCOUNTER FOR SCREENING FOR CARDIOVASCULAR DISORDERS: ICD-10-CM

## 2024-11-06 DIAGNOSIS — Z00.00 INITIAL MEDICARE ANNUAL WELLNESS VISIT: Primary | ICD-10-CM

## 2024-11-06 DIAGNOSIS — R56.9 PARTIAL SEIZURE (HCC): ICD-10-CM

## 2024-11-06 DIAGNOSIS — B37.2 INTERTRIGINOUS CANDIDIASIS: ICD-10-CM

## 2024-11-06 RX ORDER — BACLOFEN 10 MG/1
10 TABLET ORAL 2 TIMES DAILY
Qty: 60 TABLET | Refills: 2 | Status: SHIPPED | OUTPATIENT
Start: 2024-11-06

## 2024-11-06 RX ORDER — FLUCONAZOLE 100 MG/1
100 TABLET ORAL DAILY
Qty: 10 TABLET | Refills: 0 | Status: SHIPPED | OUTPATIENT
Start: 2024-11-06 | End: 2024-11-16

## 2024-11-06 RX ORDER — MEDROXYPROGESTERONE ACETATE 150 MG/ML
150 INJECTION, SUSPENSION INTRAMUSCULAR
Qty: 1 ML | Refills: 4 | Status: SHIPPED | OUTPATIENT
Start: 2024-11-06

## 2024-11-06 ASSESSMENT — PATIENT HEALTH QUESTIONNAIRE - PHQ9
2. FEELING DOWN, DEPRESSED OR HOPELESS: SEVERAL DAYS
SUM OF ALL RESPONSES TO PHQ QUESTIONS 1-9: 2
SUM OF ALL RESPONSES TO PHQ9 QUESTIONS 1 & 2: 2
SUM OF ALL RESPONSES TO PHQ QUESTIONS 1-9: 2
1. LITTLE INTEREST OR PLEASURE IN DOING THINGS: SEVERAL DAYS

## 2024-11-06 ASSESSMENT — LIFESTYLE VARIABLES
HOW MANY STANDARD DRINKS CONTAINING ALCOHOL DO YOU HAVE ON A TYPICAL DAY: PATIENT DOES NOT DRINK
HOW OFTEN DO YOU HAVE A DRINK CONTAINING ALCOHOL: NEVER

## 2024-11-06 NOTE — PROGRESS NOTES
treatment        Vision Screen:  Do you have difficulty driving, watching TV, or doing any of your daily activities because of your eyesight?: No  Have you had an eye exam within the past year?: (!) No  Interventions:    Difficult to examine      ADL's:   Patient reports needing help with:  Select all that apply: (!) Dressing, Grooming, Bathing, Toileting, Walking/Balance, Eating  Select all that apply: (!) Laundry, Housekeeping, Banking/Finances, Shopping, Telephone Use, Food Preparation, Transportation, Taking Medications  Interventions:  See above     Advanced Directives:  Do you have a Living Will?: (!) No    Intervention:  Not desired per parents                     Objective    Patient-Reported Vitals  No data recorded   Physical Exam  Gen: NAD, looks to sister most of visit, flat affect, alert and awake.  Conjunctiva bilaterally and non-icterus.  Heart: RRR.  CTAB. No edema in legs.               Allergies   Allergen Reactions    Seasonal      Prior to Visit Medications    Medication Sig Taking? Authorizing Provider   baclofen (LIORESAL) 10 MG tablet Take 1 tablet by mouth 2 times daily Yes Patrizia Diaz MD   medroxyPROGESTERone (DEPO-PROVERA) 150 MG/ML injection Inject 150 mg into the muscle every 3 months Yes Patrizia Diaz MD   fluconazole (DIFLUCAN) 100 MG tablet Take 1 tablet by mouth daily for 10 days Yes Patrizia Diaz MD   phenytoin (DILANTIN) 100 MG ER capsule TAKE THREE CAPSULES, DAILY AT BEDTIME, BY MOUTH. Yes Leopold, Paige L, APRN - CNP   Handicap Placard MISC by Does not apply route Expiration in 5 years Yes Patrizia Diaz MD   nystatin (MYCOSTATIN) 614263 UNIT/GM cream Apply topically 2 times daily. Yes Patrizia Diaz MD   diclofenac (VOLTAREN) 50 MG EC tablet TAKE ONE TABLET DAILY, BY MOUTH. Yes Patrizia Diaz MD   cetirizine (ZYRTEC) 10 MG tablet Take 1 tablet by mouth as needed for Allergies NH prn medication Yes Patrizia Diaz MD   aluminum & magnesium

## 2024-11-08 ENCOUNTER — HOSPITAL ENCOUNTER (OUTPATIENT)
Age: 44
Discharge: HOME | End: 2024-11-08
Payer: MEDICARE

## 2024-11-08 DIAGNOSIS — R73.03 PREDIABETES: ICD-10-CM

## 2024-11-08 DIAGNOSIS — N92.0 MENORRHAGIA WITH REGULAR CYCLE: Chronic | ICD-10-CM

## 2024-11-08 DIAGNOSIS — Z13.6 ENCOUNTER FOR SCREENING FOR CARDIOVASCULAR DISORDERS: ICD-10-CM

## 2024-11-08 LAB
CHOLEST SERPL-MCNC: 171 MG/DL (ref 100–199)
DEPRECATED MEAN GLUCOSE BLD GHB EST-ACNC: 111 MG/DL (ref 70–126)
FOLLICLE STIMULATING HORMONE: 9.7 MIU/ML
HBA1C MFR BLD HPLC: 5.7 % (ref 4.4–6.4)
HDLC SERPL-MCNC: 37 MG/DL
LDLC SERPL CALC-MCNC: 94 MG/DL
LUTEINIZING HORMONE: 5.5 MIU/ML (ref 1.7–8.6)
TRIGL SERPL-MCNC: 202 MG/DL (ref 0–199)

## 2024-11-08 PROCEDURE — 80061 LIPID PANEL: CPT

## 2024-11-08 PROCEDURE — 36415 COLL VENOUS BLD VENIPUNCTURE: CPT

## 2024-11-08 PROCEDURE — 83001 ASSAY OF GONADOTROPIN (FSH): CPT

## 2024-11-08 PROCEDURE — 83002 ASSAY OF GONADOTROPIN (LH): CPT

## 2024-11-08 PROCEDURE — 83036 HEMOGLOBIN GLYCOSYLATED A1C: CPT

## 2024-11-12 NOTE — RESULT ENCOUNTER NOTE
Please let mother of patient know that labs show lipid panel with bumped triglycerides but LDL okay,  A1c is stable at prediabetic level.  The hormone studies currently are normal and do not indicate menopausal period.

## 2024-12-06 ENCOUNTER — HOSPITAL ENCOUNTER (OUTPATIENT)
Dept: WOMENS IMAGING | Age: 44
Discharge: HOME | End: 2024-12-06
Attending: FAMILY MEDICINE
Payer: MEDICARE

## 2024-12-06 ENCOUNTER — TELEPHONE (OUTPATIENT)
Dept: FAMILY MEDICINE CLINIC | Age: 44
End: 2024-12-06

## 2024-12-06 DIAGNOSIS — Z79.3 LONG TERM (CURRENT) USE OF HORMONAL CONTRACEPTIVES: ICD-10-CM

## 2024-12-06 DIAGNOSIS — Z91.89 AT HIGH RISK FOR OSTEOPOROSIS: ICD-10-CM

## 2024-12-06 PROCEDURE — 77080 DXA BONE DENSITY AXIAL: CPT

## 2024-12-06 NOTE — TELEPHONE ENCOUNTER
----- Message from Dr. Patrizia Diaz MD sent at 12/6/2024  2:02 PM EST -----  Please let patient know that her dexa scan is within acceptable limits for her age.  Thank you.

## 2024-12-11 ENCOUNTER — HOSPITAL ENCOUNTER (EMERGENCY)
Age: 44
Discharge: HOME | End: 2024-12-11
Attending: EMERGENCY MEDICINE
Payer: MEDICARE

## 2024-12-11 VITALS
OXYGEN SATURATION: 98 % | BODY MASS INDEX: 42.33 KG/M2 | SYSTOLIC BLOOD PRESSURE: 138 MMHG | HEIGHT: 62 IN | WEIGHT: 230 LBS | TEMPERATURE: 97 F | HEART RATE: 105 BPM | DIASTOLIC BLOOD PRESSURE: 95 MMHG | RESPIRATION RATE: 18 BRPM

## 2024-12-11 DIAGNOSIS — H11.89 CONJUNCTIVAL IRRITATION: Primary | ICD-10-CM

## 2024-12-11 PROCEDURE — 99283 EMERGENCY DEPT VISIT LOW MDM: CPT

## 2024-12-11 RX ORDER — GENTAMICIN SULFATE 3 MG/ML
1 SOLUTION/ DROPS OPHTHALMIC 4 TIMES DAILY
Qty: 1 EACH | Refills: 0 | Status: SHIPPED | OUTPATIENT
Start: 2024-12-11 | End: 2024-12-18

## 2024-12-11 ASSESSMENT — PAIN - FUNCTIONAL ASSESSMENT
PAIN_FUNCTIONAL_ASSESSMENT: NONE - DENIES PAIN
PAIN_FUNCTIONAL_ASSESSMENT: NONE - DENIES PAIN

## 2024-12-11 ASSESSMENT — LIFESTYLE VARIABLES: HOW OFTEN DO YOU HAVE A DRINK CONTAINING ALCOHOL: NEVER

## 2024-12-11 NOTE — ED PROVIDER NOTES
[facility]  [address] [place] [id number]  Phone: [phone]  EMERGENCY DEPARTMENT ENCOUNTER      Pt Name: Trish Pina  MRN: [id number]  Birthdate 1980  Date of evaluation: 12/11/2024  Provider: Osmel Nickerson MD    CHIEF COMPLAINT       Chief Complaint   Patient presents with    Eye Problem     Left eye dng and redness         HISTORY OF PRESENT ILLNESS      Trish Pina is a 44 y.o. female who presents to the emergency department with above-noted complaint.  Patient is been doing okay has developed some redness to her eyes.  At the left may be greater than right.  She is nonverbal.  Anne Scherer wanted verification that she completed stable for work.        REVIEW OF SYSTEMS     Positive for eye irritation  Review of Systems      PAST MEDICAL HISTORY     Past Medical History:   Diagnosis Date    Autism     Moderately mentally retarded     Nausea & vomiting 2013    Patellar dislocation, left, initial encounter 8/10/2018    Seizures (HCC)          SURGICAL HISTORY       Past Surgical History:   Procedure Laterality Date    CHOLECYSTECTOMY, LAPAROSCOPIC  04/05/2013    robotic assist multiport    NASAL POLYP SURGERY  2013    OPAL    DC EGD BALLOON DILATION ESOPHAGUS <30 MM DIAM N/A 10/24/2017    EGD ESOPHAGOGASTRODUODENOSCOPY DILATATION performed by Gus Moraes MD at [facility]    UPPER GASTROINTESTINAL ENDOSCOPY      UPPER GASTROINTESTINAL ENDOSCOPY N/A 11/14/2023    EGD DILATATION performed by Gus Moraes MD at [facility]         CURRENT MEDICATIONS       Previous Medications    ACETAMINOPHEN (TYLENOL) 500 MG TABLET    Take 2 tablets by mouth every 6 hours as needed for Pain or Fever    ALUMINUM & MAGNESIUM HYDROXIDE-SIMETHICONE (MYLANTA) 400-400-40 MG/5ML SUSP    Take 30 mLs by mouth every 6 hours as needed (heartburn) NH prn medication    BACLOFEN (LIORESAL) 10 MG TABLET    Take 1 tablet by mouth 2 times daily    CALCIUM CARBONATE-VITAMIN D

## 2024-12-11 NOTE — DISCHARGE INSTRUCTIONS
Use gentamicin drops 4 times a day.  Monitor for high fever or problems.  It is okay to put in both eyes.  If eyes are matted shut tomorrow please do not go to work until Friday.  Use warm compresses to the eyes.  Continue Zyrtec as priorly set up

## 2024-12-11 NOTE — ED NOTES
Pt brought in by care provider for evaluation. She was sent home from [facility] for redness and dng from left eye. Pt is nonverbal. Respirations regular and easy. No distress noted.

## 2024-12-11 NOTE — ED NOTES
Pt alert and oriented. Respirations regular and easy. Prescriptions given to care provider and instructed on use. Discharge instructions reviewed. States understanding. Pt discharged in satisfactory condition.

## 2025-01-07 ENCOUNTER — NURSE ONLY (OUTPATIENT)
Dept: FAMILY MEDICINE CLINIC | Age: 45
End: 2025-01-07
Payer: MEDICARE

## 2025-01-07 DIAGNOSIS — N92.0 MENORRHAGIA WITH REGULAR CYCLE: Primary | Chronic | ICD-10-CM

## 2025-01-07 PROCEDURE — 96372 THER/PROPH/DIAG INJ SC/IM: CPT | Performed by: FAMILY MEDICINE

## 2025-01-07 RX ORDER — MEDROXYPROGESTERONE ACETATE 150 MG/ML
150 INJECTION, SUSPENSION INTRAMUSCULAR ONCE
Status: COMPLETED | OUTPATIENT
Start: 2025-01-07 | End: 2025-01-07

## 2025-01-07 RX ADMIN — MEDROXYPROGESTERONE ACETATE 150 MG: 150 INJECTION, SUSPENSION INTRAMUSCULAR at 15:44

## 2025-01-07 NOTE — PROGRESS NOTES
After obtaining consent, and per orders of Dr. Diaz, injection  by Yashira Rodriguez MA. Patient instructed to remain in clinic for 20 minutes afterwards, and to report any adverse reaction to me immediately.    Administrations This Visit       medroxyPROGESTERone (DEPO-PROVERA) injection 150 mg       Admin Date  01/07/2025  15:44 Action  Given Dose  150 mg Route  IntraMUSCular Site  Dorsogluteal Left Documented By  Yashira Rodriguez MA    NDC: 81220-405-57    : [facility]    Patient Supplied?: Yes                    Patient tolerated well.

## 2025-01-21 ENCOUNTER — HOSPITAL ENCOUNTER (OUTPATIENT)
Dept: GENERAL RADIOLOGY | Age: 45
Discharge: HOME | End: 2025-01-21
Attending: ORTHOPAEDIC SURGERY
Payer: MEDICARE

## 2025-01-21 ENCOUNTER — HOSPITAL ENCOUNTER (OUTPATIENT)
Age: 45
Discharge: HOME | End: 2025-01-21
Payer: MEDICARE

## 2025-01-21 DIAGNOSIS — M25.551 RIGHT HIP PAIN: ICD-10-CM

## 2025-01-21 DIAGNOSIS — M25.552 LEFT HIP PAIN: ICD-10-CM

## 2025-01-21 DIAGNOSIS — M54.50 LUMBAR PAIN: ICD-10-CM

## 2025-01-21 PROCEDURE — 72100 X-RAY EXAM L-S SPINE 2/3 VWS: CPT

## 2025-01-21 PROCEDURE — 72170 X-RAY EXAM OF PELVIS: CPT

## 2025-01-22 DIAGNOSIS — M62.838 MUSCLE SPASM: ICD-10-CM

## 2025-01-22 DIAGNOSIS — M25.559 HIP PAIN, UNSPECIFIED LATERALITY: ICD-10-CM

## 2025-01-22 RX ORDER — BACLOFEN 10 MG/1
10 TABLET ORAL 2 TIMES DAILY
Qty: 60 TABLET | Refills: 2 | Status: SHIPPED | OUTPATIENT
Start: 2025-01-22

## 2025-01-22 NOTE — TELEPHONE ENCOUNTER
Trish Pina called requesting a refill on the following medications:  Requested Prescriptions     Pending Prescriptions Disp Refills    baclofen (LIORESAL) 10 MG tablet 60 tablet 2     Sig: Take 1 tablet by mouth 2 times daily       Date of last visit: 11/6/2024  Date of next visit (if applicable):5/7/2025  Date of last refill: 11/16/2024  Pharmacy Name: Yashira Chester MA

## 2025-02-28 DIAGNOSIS — M25.559 HIP PAIN, UNSPECIFIED LATERALITY: ICD-10-CM

## 2025-02-28 DIAGNOSIS — M62.838 MUSCLE SPASM: ICD-10-CM

## 2025-02-28 RX ORDER — BACLOFEN 10 MG/1
10 TABLET ORAL 2 TIMES DAILY
Qty: 60 TABLET | Refills: 2 | Status: SHIPPED | OUTPATIENT
Start: 2025-02-28

## 2025-02-28 NOTE — TELEPHONE ENCOUNTER
Trish Pina called requesting a refill on the following medications:  Requested Prescriptions     Pending Prescriptions Disp Refills    baclofen (LIORESAL) 10 MG tablet 60 tablet 2     Sig: Take 1 tablet by mouth 2 times daily       Date of last visit: 11/6/2024  Date of next visit (if applicable):5/7/2025  Date of last refill: 1/22/25  Pharmacy Name: [facility] LPN

## 2025-04-01 ENCOUNTER — CLINICAL SUPPORT (OUTPATIENT)
Dept: FAMILY MEDICINE CLINIC | Age: 45
End: 2025-04-01
Payer: MEDICARE

## 2025-04-01 DIAGNOSIS — N92.0 MENORRHAGIA WITH REGULAR CYCLE: Primary | ICD-10-CM

## 2025-04-01 PROCEDURE — 96372 THER/PROPH/DIAG INJ SC/IM: CPT | Performed by: FAMILY MEDICINE

## 2025-04-01 RX ORDER — MEDROXYPROGESTERONE ACETATE 150 MG/ML
150 INJECTION, SUSPENSION INTRAMUSCULAR ONCE
Status: COMPLETED | OUTPATIENT
Start: 2025-04-01 | End: 2025-04-01

## 2025-04-01 RX ADMIN — MEDROXYPROGESTERONE ACETATE 150 MG: 150 INJECTION, SUSPENSION INTRAMUSCULAR at 15:18

## 2025-04-01 NOTE — PROGRESS NOTES
After obtaining consent, and per orders of Dr. Diaz, injection  by Drake Schmidt LPN. Patient instructed to remain in clinic for 20 minutes afterwards, and to report any adverse reaction to me immediately.    Administrations This Visit       medroxyPROGESTERone (DEPO-PROVERA) injection 150 mg       Admin Date  04/01/2025  15:18 Action  Given Dose  150 mg Route  IntraMUSCular Site  Dorsogluteal Right Documented By  Drake Schmidt LPN    NDC: 23303-154-16    Lot#: [id number]    : [facility]    Patient Supplied?: Yes                    Patient tolerated well.

## 2025-04-14 DIAGNOSIS — R56.9 PARTIAL SEIZURE (HCC): ICD-10-CM

## 2025-04-14 RX ORDER — PHENYTOIN SODIUM 100 MG/1
CAPSULE, EXTENDED RELEASE ORAL
Qty: 90 CAPSULE | Refills: 6 | OUTPATIENT
Start: 2025-04-14

## 2025-04-14 NOTE — TELEPHONE ENCOUNTER
Trish Pina called requesting a refill on the following medications:  Requested Prescriptions     Pending Prescriptions Disp Refills    diclofenac (VOLTAREN) 50 MG EC tablet [Pharmacy Med Name: DICLOFENAC SODIUM DR 50MG DR TABLET DR] 30 tablet 11     Sig: TAKE ONE TABLET DAILY, BY MOUTH.       Date of last visit: 4/1/2025  Date of next visit (if applicable):5/7/2025  Date of last refill: 5/3/24  Pharmacy Name: Yashira Chester MA

## 2025-04-26 DIAGNOSIS — M62.838 MUSCLE SPASM: ICD-10-CM

## 2025-04-26 DIAGNOSIS — M25.559 HIP PAIN, UNSPECIFIED LATERALITY: ICD-10-CM

## 2025-04-28 RX ORDER — BACLOFEN 10 MG/1
10 TABLET ORAL 2 TIMES DAILY
Qty: 60 TABLET | Refills: 2 | Status: SHIPPED | OUTPATIENT
Start: 2025-04-28

## 2025-04-28 NOTE — TELEPHONE ENCOUNTER
Trish Pina called requesting a refill on the following medications:  Requested Prescriptions     Pending Prescriptions Disp Refills    baclofen (LIORESAL) 10 MG tablet 60 tablet 2     Sig: Take 1 tablet by mouth 2 times daily       Date of last visit: 4/1/2025  Date of next visit (if applicable):5/7/2025  Date of last refill: 2/28/25  Pharmacy Name: [facility] L[facility]

## 2025-05-04 SDOH — ECONOMIC STABILITY: INCOME INSECURITY: IN THE LAST 12 MONTHS, WAS THERE A TIME WHEN YOU WERE NOT ABLE TO PAY THE MORTGAGE OR RENT ON TIME?: NO

## 2025-05-04 SDOH — ECONOMIC STABILITY: TRANSPORTATION INSECURITY
IN THE PAST 12 MONTHS, HAS THE LACK OF TRANSPORTATION KEPT YOU FROM MEDICAL APPOINTMENTS OR FROM GETTING MEDICATIONS?: NO

## 2025-05-04 SDOH — ECONOMIC STABILITY: TRANSPORTATION INSECURITY
IN THE PAST 12 MONTHS, HAS LACK OF TRANSPORTATION KEPT YOU FROM MEETINGS, WORK, OR FROM GETTING THINGS NEEDED FOR DAILY LIVING?: NO

## 2025-05-04 SDOH — ECONOMIC STABILITY: FOOD INSECURITY: WITHIN THE PAST 12 MONTHS, THE FOOD YOU BOUGHT JUST DIDN'T LAST AND YOU DIDN'T HAVE MONEY TO GET MORE.: NEVER TRUE

## 2025-05-04 SDOH — ECONOMIC STABILITY: FOOD INSECURITY: WITHIN THE PAST 12 MONTHS, YOU WORRIED THAT YOUR FOOD WOULD RUN OUT BEFORE YOU GOT MONEY TO BUY MORE.: NEVER TRUE

## 2025-05-04 ASSESSMENT — PATIENT HEALTH QUESTIONNAIRE - PHQ9
SUM OF ALL RESPONSES TO PHQ QUESTIONS 1-9: 0
SUM OF ALL RESPONSES TO PHQ QUESTIONS 1-9: 0
2. FEELING DOWN, DEPRESSED OR HOPELESS: NOT AT ALL
1. LITTLE INTEREST OR PLEASURE IN DOING THINGS: NOT AT ALL
SUM OF ALL RESPONSES TO PHQ QUESTIONS 1-9: 0
SUM OF ALL RESPONSES TO PHQ QUESTIONS 1-9: 0
2. FEELING DOWN, DEPRESSED OR HOPELESS: NOT AT ALL
SUM OF ALL RESPONSES TO PHQ9 QUESTIONS 1 & 2: 0
1. LITTLE INTEREST OR PLEASURE IN DOING THINGS: NOT AT ALL

## 2025-05-07 ENCOUNTER — OFFICE VISIT (OUTPATIENT)
Dept: FAMILY MEDICINE CLINIC | Age: 45
End: 2025-05-07

## 2025-05-07 VITALS
BODY MASS INDEX: 44.08 KG/M2 | SYSTOLIC BLOOD PRESSURE: 130 MMHG | HEART RATE: 86 BPM | DIASTOLIC BLOOD PRESSURE: 86 MMHG | WEIGHT: 241 LBS | RESPIRATION RATE: 16 BRPM

## 2025-05-07 DIAGNOSIS — G81.11 RIGHT SPASTIC HEMIPARESIS (HCC): ICD-10-CM

## 2025-05-07 DIAGNOSIS — Z12.11 SCREEN FOR COLON CANCER: ICD-10-CM

## 2025-05-07 DIAGNOSIS — Z13.6 ENCOUNTER FOR SCREENING FOR CARDIOVASCULAR DISORDERS: ICD-10-CM

## 2025-05-07 DIAGNOSIS — J30.1 SEASONAL ALLERGIC RHINITIS DUE TO POLLEN: ICD-10-CM

## 2025-05-07 DIAGNOSIS — R56.9 PARTIAL SEIZURE (HCC): ICD-10-CM

## 2025-05-07 DIAGNOSIS — R73.03 PREDIABETES: ICD-10-CM

## 2025-05-07 DIAGNOSIS — R39.81 URINARY INCONTINENCE DUE TO COGNITIVE IMPAIRMENT: ICD-10-CM

## 2025-05-07 DIAGNOSIS — R21 PERINEAL RASH IN FEMALE: ICD-10-CM

## 2025-05-07 DIAGNOSIS — N92.0 MENORRHAGIA WITH REGULAR CYCLE: Chronic | ICD-10-CM

## 2025-05-07 DIAGNOSIS — R15.9 INCONTINENCE OF FECES, UNSPECIFIED FECAL INCONTINENCE TYPE: ICD-10-CM

## 2025-05-07 DIAGNOSIS — R56.9 GENERALIZED SEIZURE (HCC): Primary | ICD-10-CM

## 2025-05-07 DIAGNOSIS — F79 INTELLECTUAL DISABILITY: ICD-10-CM

## 2025-05-07 DIAGNOSIS — R05.9 COUGH, UNSPECIFIED TYPE: ICD-10-CM

## 2025-05-07 DIAGNOSIS — N39.42 URINARY INCONTINENCE WITHOUT SENSORY AWARENESS: ICD-10-CM

## 2025-05-07 DIAGNOSIS — R22.1 NECK FULLNESS: ICD-10-CM

## 2025-05-07 DIAGNOSIS — F84.0 AUTISM: Chronic | ICD-10-CM

## 2025-05-07 PROBLEM — D72.829 LEUKOCYTOSIS: Status: RESOLVED | Noted: 2023-11-02 | Resolved: 2025-05-07

## 2025-05-07 RX ORDER — PHENYTOIN SODIUM 100 MG/1
CAPSULE, EXTENDED RELEASE ORAL
Qty: 90 CAPSULE | Refills: 11 | Status: SHIPPED | OUTPATIENT
Start: 2025-05-07

## 2025-05-07 RX ORDER — NYSTATIN 100000 U/G
CREAM TOPICAL
Qty: 60 G | Refills: 2 | Status: SHIPPED | OUTPATIENT
Start: 2025-05-07

## 2025-05-07 ASSESSMENT — ENCOUNTER SYMPTOMS
CONSTIPATION: 0
SHORTNESS OF BREATH: 0

## 2025-05-07 NOTE — PROGRESS NOTES
Trish Pina (:  1980) is a 44 y.o. female,Established patient, here for evaluation of the following chief complaint(s):  6 Month Follow-Up      Assessment & Plan   ASSESSMENT/PLAN:  1. Generalized seizure (HCC)  -     Comprehensive Metabolic Panel; Future  -     CBC with Auto Differential; Future  -     Hemoglobin A1C; Future  -     Lipid Panel; Future  -     Magnesium; Future  -     TSH reflex to FT4; Future  -     Vitamin B12; Future  2. Perineal rash in female  -     nystatin (MYCOSTATIN) 847597 UNIT/GM cream; Apply topically 2 times daily., Disp-60 g, R-2, Normal  3. Partial seizure (HCC)  -     phenytoin (DILANTIN) 100 MG ER capsule; TAKE THREE CAPSULES, DAILY AT BEDTIME, BY MOUTH., Disp-90 capsule, R-11Normal  4. Right spastic hemiparesis (HCC)  5. Intellectual disability  -     Comprehensive Metabolic Panel; Future  -     CBC with Auto Differential; Future  -     Hemoglobin A1C; Future  -     Lipid Panel; Future  -     Magnesium; Future  -     TSH reflex to FT4; Future  -     Vitamin B12; Future  6. Menorrhagia with regular cycle -- control with DepoProvera   -     Comprehensive Metabolic Panel; Future  -     CBC with Auto Differential; Future  -     Hemoglobin A1C; Future  -     Lipid Panel; Future  -     Magnesium; Future  -     TSH reflex to FT4; Future  -     Vitamin B12; Future  7. Autism  8. Incontinence of feces, unspecified fecal incontinence type  -     Comprehensive Metabolic Panel; Future  -     CBC with Auto Differential; Future  -     Hemoglobin A1C; Future  -     Lipid Panel; Future  -     Magnesium; Future  -     TSH reflex to FT4; Future  -     Vitamin B12; Future  9. Urinary incontinence without sensory awareness  10. Urinary incontinence due to cognitive impairment  11. Seasonal allergic rhinitis due to pollen  12. Prediabetes  -     Comprehensive Metabolic Panel; Future  -     CBC with Auto Differential; Future  -     Hemoglobin A1C; Future  -     Lipid Panel; Future  -

## 2025-05-10 ASSESSMENT — ENCOUNTER SYMPTOMS: COUGH: 1

## 2025-05-13 ENCOUNTER — HOSPITAL ENCOUNTER (OUTPATIENT)
Dept: CT IMAGING | Age: 45
Discharge: HOME | End: 2025-05-13
Attending: FAMILY MEDICINE
Payer: MEDICARE

## 2025-05-13 DIAGNOSIS — R22.1 NECK FULLNESS: ICD-10-CM

## 2025-05-13 DIAGNOSIS — R05.9 COUGH, UNSPECIFIED TYPE: ICD-10-CM

## 2025-05-13 PROCEDURE — 70491 CT SOFT TISSUE NECK W/DYE: CPT

## 2025-05-13 PROCEDURE — 6360000004 HC RX CONTRAST MEDICATION: Performed by: FAMILY MEDICINE

## 2025-05-13 RX ORDER — IOPAMIDOL 755 MG/ML
75 INJECTION, SOLUTION INTRAVASCULAR
Status: COMPLETED | OUTPATIENT
Start: 2025-05-13 | End: 2025-05-13

## 2025-05-13 RX ADMIN — IOPAMIDOL 75 ML: 755 INJECTION, SOLUTION INTRAVENOUS at 09:40

## 2025-05-15 ENCOUNTER — RESULTS FOLLOW-UP (OUTPATIENT)
Dept: FAMILY MEDICINE CLINIC | Age: 45
End: 2025-05-15

## 2025-06-18 DIAGNOSIS — M62.838 MUSCLE SPASM: ICD-10-CM

## 2025-06-18 DIAGNOSIS — M25.559 HIP PAIN, UNSPECIFIED LATERALITY: ICD-10-CM

## 2025-06-18 RX ORDER — BACLOFEN 10 MG/1
TABLET ORAL
Qty: 60 TABLET | Refills: 2 | Status: SHIPPED | OUTPATIENT
Start: 2025-06-18

## 2025-06-18 NOTE — TELEPHONE ENCOUNTER
Trish Pina called requesting a refill on the following medications:  Requested Prescriptions     Pending Prescriptions Disp Refills    baclofen (LIORESAL) 10 MG tablet [Pharmacy Med Name: BACLOFEN 10MG TABLET] 60 tablet 2     Sig: TAKE ONE TABLET, TWO TIMES A DAY, BY MOUTH.       Date of last visit: 5/7/2025  Date of next visit (if applicable):11/14/2025  Date of last refill: 4/28/25  Pharmacy Name: [facility] L[facility]

## 2025-06-19 DIAGNOSIS — J30.1 SEASONAL ALLERGIC RHINITIS DUE TO POLLEN: ICD-10-CM

## 2025-06-19 RX ORDER — ALUMINA, MAGNESIA, AND SIMETHICONE 2400; 2400; 240 MG/30ML; MG/30ML; MG/30ML
30 SUSPENSION ORAL EVERY 6 HOURS PRN
COMMUNITY
Start: 2025-06-19

## 2025-06-19 RX ORDER — ACETAMINOPHEN 500 MG
1000 TABLET ORAL EVERY 6 HOURS PRN
COMMUNITY
Start: 2025-06-19

## 2025-06-19 RX ORDER — ACETAMINOPHEN 160 MG
TABLET,DISINTEGRATING ORAL
COMMUNITY
Start: 2025-06-19

## 2025-06-19 RX ORDER — CETIRIZINE HYDROCHLORIDE 10 MG/1
10 TABLET ORAL PRN
COMMUNITY
Start: 2025-06-19

## 2025-06-19 RX ORDER — TETRAHYDROZOLINE HCL 0.05 %
2 DROPS OPHTHALMIC (EYE) AS NEEDED
COMMUNITY
Start: 2025-06-19

## 2025-06-19 RX ORDER — NEOMYCIN/BACITRACIN/POLYMYXINB 3.5-400-5K
OINTMENT (GRAM) TOPICAL
COMMUNITY
Start: 2025-06-19

## 2025-06-19 NOTE — TELEPHONE ENCOUNTER
Trish Pina called requesting a refill on the following medications:  Requested Prescriptions     Pending Prescriptions Disp Refills    cetirizine (ZYRTEC) 10 MG tablet 100 tablet 0     Sig: Take 1 tablet by mouth as needed for Allergies NH prn medication    aluminum & magnesium hydroxide-simethicone (MYLANTA) 400-400-40 MG/5ML SUSP 100 mL 0     Sig: Take 30 mLs by mouth every 6 hours as needed (heartburn) NH prn medication    acetaminophen (TYLENOL) 500 MG tablet 100 tablet 0     Sig: Take 2 tablets by mouth every 6 hours as needed for Pain or Fever    tetrahydrozoline (VISINE) 0.05 % ophthalmic solution 30 mL 0     Sig: Place 2 drops into both eyes as needed (Every 4 hours as needed for allergies) NH prn medication    hydrogen peroxide 3 % external solution 118 mL 0     Sig: Use to clean cuts and abrasions, apply topically as needed.  NH prn medication    neomycin-bacitracin-polymyxin (NEOSPORIN) 5-400-5000 ointment 28 g 1     Sig: Apply topically to affected areas, as needed for infection. NH prn medication    DM-Phenylephrine-Acetaminophen 10-5-325 MG TABS 60 tablet 0     Sig: Take 2 tablets by mouth as needed (Every 4 hours) NH prn medication       Date of last visit: 5/7/2025  Date of next visit (if applicable):11/14/2025  Date of last refill: 11/10/2023   [facility] Name: [facility]       Yashira Garcia MA

## 2025-06-30 ENCOUNTER — CLINICAL SUPPORT (OUTPATIENT)
Dept: FAMILY MEDICINE CLINIC | Age: 45
End: 2025-06-30
Payer: MEDICARE

## 2025-06-30 DIAGNOSIS — Z23 NEED FOR VACCINATION: Primary | ICD-10-CM

## 2025-06-30 PROCEDURE — 96372 THER/PROPH/DIAG INJ SC/IM: CPT | Performed by: FAMILY MEDICINE

## 2025-06-30 RX ORDER — MEDROXYPROGESTERONE ACETATE 150 MG/ML
150 INJECTION, SUSPENSION INTRAMUSCULAR ONCE
Status: COMPLETED | OUTPATIENT
Start: 2025-06-30 | End: 2025-06-30

## 2025-06-30 RX ADMIN — MEDROXYPROGESTERONE ACETATE 150 MG: 150 INJECTION, SUSPENSION INTRAMUSCULAR at 15:39

## 2025-06-30 NOTE — PROGRESS NOTES
Pt is her for depo injection.  After getting consent and per the orders of Dr. Diaz, I injected 1mL into the pt's left dorsogluteal muscle.  Pt tolerated well.

## 2025-08-05 DIAGNOSIS — M25.559 HIP PAIN, UNSPECIFIED LATERALITY: ICD-10-CM

## 2025-08-05 DIAGNOSIS — M62.838 MUSCLE SPASM: ICD-10-CM

## 2025-08-06 RX ORDER — BACLOFEN 10 MG/1
10 TABLET ORAL 2 TIMES DAILY
Qty: 180 TABLET | Refills: 3 | Status: SHIPPED | OUTPATIENT
Start: 2025-08-06

## (undated) DEVICE — TUBING IV STOPCOCK 48 CM 3 W

## (undated) DEVICE — SET ADMIN 25ML L117IN PMP MOD CK VLV RLER CLMP 2 SMRTSITE

## (undated) DEVICE — CONMED SCOPE SAVER BITE BLOCK, 20X27 MM: Brand: SCOPE SAVER

## (undated) DEVICE — SOLUTION IV 1000ML 0.45% SOD CHL PH 5 INJ USP VIAFLX PLAS

## (undated) DEVICE — ENDO KIT: Brand: MEDLINE INDUSTRIES, INC.

## (undated) DEVICE — CATHETER ETER IV L1IN OD22GA POLYUR PERIPH WNG HUB SFTY SHLD

## (undated) DEVICE — IV START KIT: Brand: MEDLINE INDUSTRIES, INC.